# Patient Record
Sex: MALE | Race: WHITE | Employment: OTHER | URBAN - METROPOLITAN AREA
[De-identification: names, ages, dates, MRNs, and addresses within clinical notes are randomized per-mention and may not be internally consistent; named-entity substitution may affect disease eponyms.]

---

## 2020-12-09 ENCOUNTER — HOSPITAL ENCOUNTER (EMERGENCY)
Age: 80
Discharge: HOME | End: 2020-12-09
Payer: MEDICARE

## 2020-12-09 VITALS
HEART RATE: 70 BPM | DIASTOLIC BLOOD PRESSURE: 90 MMHG | OXYGEN SATURATION: 98 % | HEIGHT: 73 IN | WEIGHT: 230 LBS | BODY MASS INDEX: 30.48 KG/M2 | TEMPERATURE: 97.1 F | SYSTOLIC BLOOD PRESSURE: 150 MMHG | RESPIRATION RATE: 18 BRPM

## 2020-12-09 PROCEDURE — 99212 OFFICE O/P EST SF 10 MIN: CPT

## 2020-12-09 RX ORDER — LEVOTHYROXINE SODIUM 88 UG/1
88 TABLET ORAL DAILY
COMMUNITY

## 2020-12-09 RX ORDER — GLIPIZIDE 5 MG/1
5 TABLET ORAL
COMMUNITY

## 2020-12-09 SDOH — HEALTH STABILITY: MENTAL HEALTH: HOW OFTEN DO YOU HAVE A DRINK CONTAINING ALCOHOL?: NEVER

## 2020-12-09 ASSESSMENT — PAIN DESCRIPTION - ORIENTATION: ORIENTATION: RIGHT

## 2020-12-09 ASSESSMENT — PAIN - FUNCTIONAL ASSESSMENT: PAIN_FUNCTIONAL_ASSESSMENT: ACTIVITIES ARE NOT PREVENTED

## 2020-12-09 ASSESSMENT — PAIN DESCRIPTION - PROGRESSION: CLINICAL_PROGRESSION: NOT CHANGED

## 2020-12-09 ASSESSMENT — PAIN DESCRIPTION - ONSET: ONSET: ON-GOING

## 2020-12-09 ASSESSMENT — PAIN DESCRIPTION - DESCRIPTORS: DESCRIPTORS: OTHER (COMMENT)

## 2020-12-09 ASSESSMENT — PAIN SCALES - GENERAL: PAINLEVEL_OUTOF10: 2

## 2020-12-09 ASSESSMENT — PAIN DESCRIPTION - FREQUENCY: FREQUENCY: INTERMITTENT

## 2020-12-09 ASSESSMENT — PAIN DESCRIPTION - PAIN TYPE: TYPE: ACUTE PAIN

## 2020-12-09 ASSESSMENT — PAIN DESCRIPTION - LOCATION: LOCATION: CHEST

## 2020-12-09 NOTE — ED PROVIDER NOTES
Department of Emergency Medicine  [address] [place] [address]  Provider Note  Admit Date/Time: 2020  9:34 AM  Room:   NAME: Ana Mesa  : 1940  MRN: [id number]     Chief Complaint:  Chest Pain (Right Side/ Pt states \"It started Last night with a sense of Fullness & now this morning when I bend over I notice it so I don't know if it's COVID or My Heart\" )    History of Present Illness        Ana Mesa is a 78 y.o. male who has a past medical history of:   Past Medical History:   Diagnosis Date    Diabetes mellitus ([address]     Thyroid disease     presents to the urgent care center by private car for complaints of right upper chest pain only when he bends down to tie his shoes--- he said that it feels like  a sense of pressure. He said he thinks he could have Covid because of this or could be his heart. He denies any chest pain or shortness of breath now. He  said he does not have a fever or cold symptoms does not have a cough does not have loss of taste of smell, sore throat, fever, or body ache or any other complaints. Started last night. ROS    Pertinent positives and negatives are stated within HPI, all other systems reviewed and are negative. History reviewed. No pertinent surgical history. Social History:  reports that he quit smoking about 40 years ago. He has never used smokeless tobacco. He reports that he does not drink alcohol or use drugs. Family History: family history is not on file. Allergies: Patient has no known allergies. Physical Exam   Oxygen Saturation Interpretation: Normal.   ED Triage Vitals [20 0945]   BP Temp Temp Source Pulse Resp SpO2 Height Weight   (!) 150/90 97.1 °F (36.2 °C) Temporal 70 18 98 % 6' 1\" (1.854 m) 230 lb (104.3 kg)         Physical Exam  · Constitutional/General: Alert and oriented x3, well appearing, non toxic in NAD  · HEENT:  NC/NT.clear,  Airway patent.   · Neck: Supple, full ROM,   · Respiratory: Lungs clear to auscultation bilaterally, no wheezes, rales, or rhonchi. Not in respiratory distress  · CV:  Regular rate. Regular rhythm. · Chest: no chest wall tenderness  · Musculoskeletal: Moves all extremities x 4.   · Integument: skin warm and dry. No rashes. · Lymphatic: no lymphadenopathy noted  · Neurologic: GCS 15, no focal deficits,   · Psychiatric: Normal Affect    Lab / Imaging Results   (All laboratory and radiology results have been personally reviewed by myself)  Labs:  No results found for this visit on 12/09/20. Imaging: All Radiology results interpreted by Radiologist unless otherwise noted. No orders to display       ED Course / Medical Decision Making   Medications - No data to display         MDM:   Patient here he said last night and this morning when he went to tie his shoes he felt a sense of fullness in his right chest he said it was not really pain he said it is the only time he felt it. He does not have any pain now he does not have a fever does not have a cough does not have shortness of breath. Anthony Carroll he was worried it could be Covid because he read online that that could be a symptom. I did tell him  he has no symptoms of Covid  -- he denies cough ,shortness of breath, loss of taste or smell , or sore throat,  I told him that a  Sensation of pressure when you bend  down to tie your  Shoes is not a symptom of covid. .  I did tell him that I can do a chest x-ray to make sure that was okay-- but  he refused. I did tell him if he has any further chest pain he needs to go to the ED for a  cardiac work-up. Assessment      1.  Chest pain, unspecified type      Plan   Discharge to home and advised to contact Yonas Thomas, [address] [phone]    Schedule an appointment as soon as possible for a visit      Patient condition is good    New Medications     New Prescriptions    No medications on file     Electronically signed by NICK Sandy CNP

## 2022-07-10 ENCOUNTER — APPOINTMENT (OUTPATIENT)
Dept: ULTRASOUND IMAGING | Age: 82
End: 2022-07-10
Payer: MEDICARE

## 2022-07-10 ENCOUNTER — HOSPITAL ENCOUNTER (EMERGENCY)
Age: 82
Discharge: HOME | End: 2022-07-10
Attending: EMERGENCY MEDICINE
Payer: MEDICARE

## 2022-07-10 VITALS
TEMPERATURE: 97.4 F | RESPIRATION RATE: 16 BRPM | DIASTOLIC BLOOD PRESSURE: 75 MMHG | HEART RATE: 82 BPM | SYSTOLIC BLOOD PRESSURE: 179 MMHG | OXYGEN SATURATION: 98 %

## 2022-07-10 DIAGNOSIS — I80.01 THROMBOPHLEBITIS OF SUPERFICIAL VEINS OF RIGHT LOWER EXTREMITY: Primary | ICD-10-CM

## 2022-07-10 PROCEDURE — 6370000000 HC RX 637 (ALT 250 FOR IP): Performed by: PHYSICIAN ASSISTANT

## 2022-07-10 PROCEDURE — 99284 EMERGENCY DEPT VISIT MOD MDM: CPT

## 2022-07-10 PROCEDURE — 93971 EXTREMITY STUDY: CPT

## 2022-07-10 RX ORDER — ACETAMINOPHEN 500 MG
1000 TABLET ORAL ONCE
Status: COMPLETED | OUTPATIENT
Start: 2022-07-10 | End: 2022-07-10

## 2022-07-10 RX ADMIN — ACETAMINOPHEN 1000 MG: 500 TABLET ORAL at 11:33

## 2022-07-10 ASSESSMENT — PAIN DESCRIPTION - LOCATION: LOCATION: LEG

## 2022-07-10 ASSESSMENT — PAIN SCALES - GENERAL: PAINLEVEL_OUTOF10: 8

## 2022-07-10 NOTE — ED NOTES
Department of Emergency Medicine  FIRST PROVIDER TRIAGE NOTE             Independent MLP           7/10/22  10:33 AM EDT    Date of Encounter: 7/10/22   MRN: [id number]      HPI: Daria Holloway is a 78 y.o. male who presents to the ED for No chief complaint on file. Patient reports that for approximately 1 week he has noticed redness and swelling over the inside of his left lower extremity which has spread to his right inner thigh region due to no known injury. He states that he did see his primary care provider whom stated this was a \"vein\" but was not concerned for blood clot. He states he is presenting today as the pain is worsening. He denies additional symptoms. Patient denies taking blood thinner daily. Patient denies any history of DVTs. ROS: Negative for cp, sob, abd pain, back pain, fever, cough, vomiting, diarrhea, urinary complaints, headache or dizziness. PE: Gen Appearance/Constitutional: alert  CV: regular rate  Pulm: CTA bilat  GI: soft and NT     Initial Plan of Care: All treatment areas with department are currently occupied. Plan to order/Initiate the following while awaiting opening in ED: labs, ultrasound and analgesics.   Initiate Treatment-Testing, Proceed toTreatment Area When Bed Available for ED Attending/MLP to Continue Care    Electronically signed by JOSE LUIS Carrion   DD: 7/10/22         Myah Carrion  07/10/22 1036

## 2022-07-10 NOTE — ED PROVIDER NOTES
presenting with right leg pain, swelling. US consistent with superficial thrombophlebitis. Patient neurovascularly intact and instructed to use NSAIDs, heat, and elevation of right leg. He was given referral to vascular surgery at his request. He was discharged home in stable condition. ED Course as of 07/10/22 1251   Sun Jul 10, 2022   1236   ATTENDING PROVIDER ATTESTATION:     I have personally performed and/or participated in the history, exam, medical decision making, and procedures and agree with all pertinent clinical information unless otherwise noted. I have also reviewed and agree with the past medical, family and social history unless otherwise noted. I have discussed this patient in detail with the resident and provided the instruction and education regarding the evidence-based evaluation and treatment of right lower extremity pain. History: Patient has noticed that he has had pain in the right calf and thigh. No reported chest pain or shortness of breath. No recent travel or hospitalization. My findings: Bárbara Garvey is a 78 y.o. male whom is in no distress. Physical exam reveals reveals a mild area of erythema on the inner aspect of the right thigh and calf. There is ropiness underneath the area. Findings consistent with thrombophlebitis    My plan: Symptomatic and supportive care. Appropriate imaging    Electronically signed by Tatyana Rosales DO on 7/10/22 at 12:43 PM EDT       [MS]      ED Course User Index  [MS] Tatyana Rosales DO        ED Course as of 07/11/22 1013   Sun Jul 10, 2022   0239   ATTENDING PROVIDER ATTESTATION:     I have personally performed and/or participated in the history, exam, medical decision making, and procedures and agree with all pertinent clinical information unless otherwise noted. I have also reviewed and agree with the past medical, family and social history unless otherwise noted.     I have discussed this patient in detail with the resident and provided the instruction and education regarding the evidence-based evaluation and treatment of right lower extremity pain. History: Patient has noticed that he has had pain in the right calf and thigh. No reported chest pain or shortness of breath. No recent travel or hospitalization. My findings: Murtaza Ruano is a 78 y.o. male whom is in no distress. Physical exam reveals reveals a mild area of erythema on the inner aspect of the right thigh and calf. There is ropiness underneath the area. Findings consistent with thrombophlebitis    My plan: Symptomatic and supportive care. Appropriate imaging    Electronically signed by Susu Alexander DO on 7/10/22 at 12:43 PM EDT       [MS]      ED Course User Index  [MS] Susu Alexander DO       --------------------------------------------- PAST HISTORY ---------------------------------------------  Past Medical History:  has a past medical history of Diabetes mellitus ([facility] [address] and Thyroid disease. Past Surgical History:  has no past surgical history on file. Social History:  reports that he quit smoking about 42 years ago. He has never used smokeless tobacco. He reports that he does not drink alcohol and does not use drugs. Family History: family history is not on file. The patients home medications have been reviewed. Allergies: Patient has no known allergies. -------------------------------------------------- RESULTS -------------------------------------------------  Labs:  No results found for this visit on 07/10/22. Radiology:  US DUP LOWER EXTREMITY RIGHT [place]   Final Result   1. Upper right calf superficial thrombophlebitis. 2.  No DVT, right lower extremity.             ------------------------- NURSING NOTES AND VITALS REVIEWED ---------------------------  Date / Time Roomed:  7/10/2022 11:18 AM  ED Bed Assignment:  20/20    The nursing notes within the ED encounter and vital signs as below have been reviewed.    BP (!) 179/75 Pulse 82   Temp 97.4 °F (36.3 °C) (Oral)   Resp 16   SpO2 98%   Oxygen Saturation Interpretation: Normal      ------------------------------------------ PROGRESS NOTES ------------------------------------------    I have spoken with the patient and discussed todays results, in addition to providing specific details for the plan of care and counseling regarding the diagnosis and prognosis. Their questions are answered at this time and they are agreeable with the plan. I discussed at length with them reasons for immediate return here for re evaluation. They will followup with vascular surgery by calling their office tomorrow and on Monday.      --------------------------------- ADDITIONAL PROVIDER NOTES ---------------------------------  At this time the patient is without objective evidence of an acute process requiring hospitalization or inpatient management. They have remained hemodynamically stable throughout their entire ED visit and are stable for discharge with outpatient follow-up. The plan has been discussed in detail and they are aware of the specific conditions for emergent return, as well as the importance of follow-up. Discharge Medication List as of 7/10/2022 12:42 PM          Diagnosis:  1. Thrombophlebitis of superficial veins of right lower extremity        Disposition:  Patient's disposition: Discharge to home  Patient's condition is stable.           Lori Holbrook MD  Resident  07/11/22 5497

## 2022-07-11 ASSESSMENT — ENCOUNTER SYMPTOMS
COUGH: 0
SHORTNESS OF BREATH: 0

## 2022-07-17 ENCOUNTER — APPOINTMENT (OUTPATIENT)
Dept: ULTRASOUND IMAGING | Age: 82
End: 2022-07-17
Payer: MEDICARE

## 2022-07-17 ENCOUNTER — HOSPITAL ENCOUNTER (EMERGENCY)
Age: 82
Discharge: HOME | End: 2022-07-17
Attending: EMERGENCY MEDICINE
Payer: MEDICARE

## 2022-07-17 VITALS
WEIGHT: 220 LBS | OXYGEN SATURATION: 97 % | BODY MASS INDEX: 29.16 KG/M2 | SYSTOLIC BLOOD PRESSURE: 146 MMHG | HEIGHT: 73 IN | DIASTOLIC BLOOD PRESSURE: 67 MMHG | HEART RATE: 74 BPM | TEMPERATURE: 98.2 F | RESPIRATION RATE: 16 BRPM

## 2022-07-17 DIAGNOSIS — M79.605 BILATERAL LEG PAIN: Primary | ICD-10-CM

## 2022-07-17 DIAGNOSIS — M79.604 BILATERAL LEG PAIN: Primary | ICD-10-CM

## 2022-07-17 PROCEDURE — 93970 EXTREMITY STUDY: CPT

## 2022-07-17 PROCEDURE — 99284 EMERGENCY DEPT VISIT MOD MDM: CPT

## 2022-07-17 ASSESSMENT — ENCOUNTER SYMPTOMS
SHORTNESS OF BREATH: 0
COUGH: 0
NAUSEA: 0
ABDOMINAL PAIN: 0
BACK PAIN: 0

## 2022-07-17 NOTE — ED PROVIDER NOTES
41-year-old male diagnosed with superficial thrombophlebitis 1 week ago presenting for leg pain. Symptoms are present for 1 week and are present in both legs. Patient states that when he lays down at night and puts his legs up, he develops burning in both legs. He states he is not any pain with walking or any other time. He has been taking Advil and using heat compresses. He called his PCP and dermatologist this week who told him to come to the ED, but patient states that he has a date to come. Vascular surgery follow-up this Wednesday. Patient denies any fevers, chest pain, shortness of breath, numbness, or tingling. Review of Systems   Constitutional:  Negative for chills and fever. Respiratory:  Negative for cough and shortness of breath. Cardiovascular:  Negative for chest pain. Gastrointestinal:  Negative for abdominal pain and nausea. Musculoskeletal:  Negative for back pain. Bilateral leg pain   Skin:  Negative for wound. Neurological:  Negative for dizziness, weakness, light-headedness and numbness. All other systems reviewed and are negative. Physical Exam  Constitutional:       General: He is not in acute distress. Appearance: He is not ill-appearing. HENT:      Head: Normocephalic and atraumatic. Right Ear: External ear normal.      Left Ear: External ear normal.      Nose: Nose normal.   Eyes:      Conjunctiva/sclera: Conjunctivae normal.   Cardiovascular:      Rate and Rhythm: Normal rate and regular rhythm. Comments: Distal pulses intact  Pulmonary:      Effort: Pulmonary effort is normal.      Breath sounds: Normal breath sounds. Abdominal:      General: There is no distension. Musculoskeletal:      Comments: Palpable cord right medial leg, tender to palpation with very mild overlying erythema; palpable cord left medial leg, tender to palpation with very minimal erythema   Skin:     General: Skin is warm and dry.    Neurological:      General: No focal deficit present. Mental Status: He is alert. Sensory: No sensory deficit. Motor: No weakness. Psychiatric:         Mood and Affect: Mood normal.         Behavior: Behavior normal.        Procedures     MDM  Number of Diagnoses or Management Options  Bilateral leg pain  Diagnosis management comments: 78-year-old male with recently diagnosed, phlebitis presenting for continued leg pain. Bilateral lower extremity ultrasound was obtained, was unremarkable. Patient has follow-up appointment with vascular surgeon on Wednesday. This time, he is stable for discharge to outpatient follow-up. He was discharged home in stable condition with instructions to follow-up with his vascular surgeon on Wednesday. ED Course as of 07/18/22 0029   [place] Jul 17, 2022   4190   ATTENDING PROVIDER ATTESTATION:     I have personally performed and/or participated in the history, exam, medical decision making, and procedures and agree with all pertinent clinical information unless otherwise noted. I have also reviewed and agree with the past medical, family and social history unless otherwise noted. I have discussed this patient in detail with the resident and provided the instruction and education regarding the evidence-based evaluation and treatment of thrombuphlebitis. History: Patient had been seen here recently and was diagnosed with thrombophlebitis. Was having persistent symptoms and called his PCP who advised him to come here to have another ultrasound done. He has an appoint with a vascular surgeon on Wednesday. He denies any chest pain or shortness of breath. My findings: Gillian Barcenas is a 78 y.o. male whom is in no distress. Physical exam reveals patient to be lying in bed comfortably. He does have extensive thrombophlebitis of the right lower extremity. Areas are tender and ropey. No edema of the lower extremities. My plan: Symptomatic and supportive care.   Appropriate imaging    Electronically signed by Marylou Lane DO on 7/17/22 at 5:15 PM EDT       [MS]      ED Course User Index  [MS] Marylou Lane DO        ED Course as of 07/18/22 0029   Sun Jul 17, 2022   1715   ATTENDING PROVIDER ATTESTATION:     I have personally performed and/or participated in the history, exam, medical decision making, and procedures and agree with all pertinent clinical information unless otherwise noted. I have also reviewed and agree with the past medical, family and social history unless otherwise noted. I have discussed this patient in detail with the resident and provided the instruction and education regarding the evidence-based evaluation and treatment of thrombuphlebitis. History: Patient had been seen here recently and was diagnosed with thrombophlebitis. Was having persistent symptoms and called his PCP who advised him to come here to have another ultrasound done. He has an appoint with a vascular surgeon on Wednesday. He denies any chest pain or shortness of breath. My findings: Fay Pulido is a 78 y.o. male whom is in no distress. Physical exam reveals patient to be lying in bed comfortably. He does have extensive thrombophlebitis of the right lower extremity. Areas are tender and ropey. No edema of the lower extremities. My plan: Symptomatic and supportive care. Appropriate imaging    Electronically signed by Marylou Lane DO on 7/17/22 at 5:15 PM EDT       [MS]      ED Course User Index  [MS] Marylou Lane DO       --------------------------------------------- PAST HISTORY ---------------------------------------------  Past Medical History:  has a past medical history of Diabetes mellitus ([facility] [address] and Thyroid disease. Past Surgical History:  has no past surgical history on file. Social History:  reports that he quit smoking about 42 years ago.  He has never used smokeless tobacco. He reports that he does not drink alcohol and does not use drugs. Family History: family history is not on file. The patients home medications have been reviewed. Allergies: Patient has no known allergies. -------------------------------------------------- RESULTS -------------------------------------------------  Labs:  No results found for this visit on 07/17/22. Radiology:  US DUP LOWER EXTREMITIES BILATERAL VENOUS   Final Result   No evidence of DVT in either lower extremity.             ------------------------- NURSING NOTES AND VITALS REVIEWED ---------------------------  Date / Time Roomed:  7/17/2022  3:18 PM  ED Bed Assignment:  13/13    The nursing notes within the ED encounter and vital signs as below have been reviewed. BP (!) 146/67   Pulse 74   Temp 98.2 °F (36.8 °C) (Infrared)   Resp 16   Ht 6' 1\" (1.854 m)   Wt 220 lb (99.8 kg)   SpO2 97%   BMI 29.03 kg/m²   Oxygen Saturation Interpretation: Normal      ------------------------------------------ PROGRESS NOTES ------------------------------------------    I have spoken with the patient and discussed todays results, in addition to providing specific details for the plan of care and counseling regarding the diagnosis and prognosis. Their questions are answered at this time and they are agreeable with the plan. I discussed at length with them reasons for immediate return here for re evaluation. They will followup with their  vascular surgeon  by going to their office on Wednesday.      --------------------------------- ADDITIONAL PROVIDER NOTES ---------------------------------  At this time the patient is without objective evidence of an acute process requiring hospitalization or inpatient management. They have remained hemodynamically stable throughout their entire ED visit and are stable for discharge with outpatient follow-up. The plan has been discussed in detail and they are aware of the specific conditions for emergent return, as well as the importance of follow-up. Discharge Medication List as of 7/17/2022  5:27 PM          Diagnosis:  1. Bilateral leg pain        Disposition:  Patient's disposition: Discharge to home  Patient's condition is stable.        Jenifer Angela MD  Resident  07/18/22 2005

## 2025-04-01 PROBLEM — E78.2 MIXED HYPERLIPIDEMIA: Status: ACTIVE | Noted: 2025-04-01

## 2025-04-01 PROBLEM — C61 PROSTATE CANCER (MULTI): Status: ACTIVE | Noted: 2025-04-01

## 2025-04-01 PROBLEM — I10 BENIGN ESSENTIAL HYPERTENSION: Status: ACTIVE | Noted: 2025-04-01

## 2025-04-01 PROBLEM — N52.9 ED (ERECTILE DYSFUNCTION): Status: ACTIVE | Noted: 2025-04-01

## 2025-04-01 PROBLEM — R07.89 MID STERNAL CHEST PAIN: Status: ACTIVE | Noted: 2025-04-01

## 2025-04-01 PROBLEM — I25.10 CORONARY ARTERY DISEASE, NON-OCCLUSIVE: Status: ACTIVE | Noted: 2025-04-01

## 2025-04-01 PROBLEM — R42 LIGHTHEADEDNESS: Status: ACTIVE | Noted: 2025-04-01

## 2025-04-01 PROBLEM — T66.XXXS LATE EFFECT OF RADIATION: Status: ACTIVE | Noted: 2025-04-01

## 2025-04-01 PROBLEM — E11.9 DIABETES MELLITUS (MULTI): Status: ACTIVE | Noted: 2025-04-01

## 2025-04-01 PROBLEM — R35.0 URINARY FREQUENCY: Status: ACTIVE | Noted: 2025-04-01

## 2025-04-03 ENCOUNTER — OFFICE VISIT (OUTPATIENT)
Dept: CARDIOLOGY | Facility: CLINIC | Age: 85
End: 2025-04-03
Payer: MEDICARE

## 2025-04-03 VITALS
DIASTOLIC BLOOD PRESSURE: 83 MMHG | WEIGHT: 202.56 LBS | HEART RATE: 93 BPM | OXYGEN SATURATION: 96 % | BODY MASS INDEX: 26.85 KG/M2 | SYSTOLIC BLOOD PRESSURE: 129 MMHG | HEIGHT: 73 IN

## 2025-04-03 DIAGNOSIS — I50.32 CHRONIC DIASTOLIC HEART FAILURE: Primary | ICD-10-CM

## 2025-04-03 DIAGNOSIS — I44.7 LBBB (LEFT BUNDLE BRANCH BLOCK): ICD-10-CM

## 2025-04-03 DIAGNOSIS — I25.10 CORONARY ARTERY DISEASE, NON-OCCLUSIVE: ICD-10-CM

## 2025-04-03 DIAGNOSIS — E11.9 DIABETES MELLITUS TYPE II, NON INSULIN DEPENDENT (MULTI): ICD-10-CM

## 2025-04-03 PROCEDURE — 1036F TOBACCO NON-USER: CPT | Performed by: INTERNAL MEDICINE

## 2025-04-03 PROCEDURE — 99204 OFFICE O/P NEW MOD 45 MIN: CPT | Performed by: INTERNAL MEDICINE

## 2025-04-03 PROCEDURE — 3078F DIAST BP <80 MM HG: CPT | Performed by: INTERNAL MEDICINE

## 2025-04-03 PROCEDURE — 3074F SYST BP LT 130 MM HG: CPT | Performed by: INTERNAL MEDICINE

## 2025-04-03 PROCEDURE — 1160F RVW MEDS BY RX/DR IN RCRD: CPT | Performed by: INTERNAL MEDICINE

## 2025-04-03 PROCEDURE — 93010 ELECTROCARDIOGRAM REPORT: CPT | Performed by: INTERNAL MEDICINE

## 2025-04-03 PROCEDURE — 93005 ELECTROCARDIOGRAM TRACING: CPT | Performed by: INTERNAL MEDICINE

## 2025-04-03 PROCEDURE — 99214 OFFICE O/P EST MOD 30 MIN: CPT | Mod: 25 | Performed by: INTERNAL MEDICINE

## 2025-04-03 PROCEDURE — 1159F MED LIST DOCD IN RCRD: CPT | Performed by: INTERNAL MEDICINE

## 2025-04-03 PROCEDURE — 1126F AMNT PAIN NOTED NONE PRSNT: CPT | Performed by: INTERNAL MEDICINE

## 2025-04-03 RX ORDER — FLASH GLUCOSE SENSOR
KIT MISCELLANEOUS
COMMUNITY
Start: 2025-01-08

## 2025-04-03 RX ORDER — LEVOTHYROXINE SODIUM 88 UG/1
1 TABLET ORAL
COMMUNITY
Start: 2025-03-14

## 2025-04-03 RX ORDER — GLIPIZIDE 2.5 MG/1
TABLET ORAL
COMMUNITY
Start: 2024-12-06 | End: 2025-04-03 | Stop reason: ALTCHOICE

## 2025-04-03 RX ORDER — EMPAGLIFLOZIN 10 MG/1
1 TABLET, FILM COATED ORAL
COMMUNITY
Start: 2025-03-26

## 2025-04-03 RX ORDER — METFORMIN HYDROCHLORIDE 500 MG/1
1 TABLET ORAL
COMMUNITY
Start: 2024-07-17 | End: 2025-04-03 | Stop reason: ALTCHOICE

## 2025-04-03 RX ORDER — LATANOPROST 50 UG/ML
1 SOLUTION/ DROPS OPHTHALMIC
COMMUNITY
Start: 2024-05-11

## 2025-04-03 RX ORDER — BIMATOPROST 0.1 MG/ML
1 SOLUTION/ DROPS OPHTHALMIC
COMMUNITY
Start: 2025-02-04

## 2025-04-03 RX ORDER — EMPAGLIFLOZIN 25 MG/1
1 TABLET, FILM COATED ORAL
COMMUNITY
Start: 2025-03-07 | End: 2025-04-03 | Stop reason: DRUGHIGH

## 2025-04-03 RX ORDER — ATORVASTATIN CALCIUM 10 MG/1
TABLET, FILM COATED ORAL
COMMUNITY
Start: 2024-07-30 | End: 2025-04-03 | Stop reason: ALTCHOICE

## 2025-04-03 RX ORDER — NAPROXEN SODIUM 220 MG/1
1 TABLET, FILM COATED ORAL DAILY
COMMUNITY

## 2025-04-03 RX ORDER — SILDENAFIL CITRATE 20 MG/1
TABLET ORAL
COMMUNITY
Start: 2016-07-29

## 2025-04-03 RX ORDER — ROSUVASTATIN CALCIUM 5 MG/1
1 TABLET, COATED ORAL
COMMUNITY
Start: 2024-08-27 | End: 2025-04-03 | Stop reason: ALTCHOICE

## 2025-04-03 RX ORDER — NIACIN (INOSITOL NIACINATE) 400(500MG)
1 CAPSULE ORAL DAILY
COMMUNITY
Start: 2013-10-23

## 2025-04-03 RX ORDER — GLIMEPIRIDE 2 MG/1
1 TABLET ORAL DAILY
COMMUNITY
Start: 2019-01-07 | End: 2025-04-03 | Stop reason: ALTCHOICE

## 2025-04-03 ASSESSMENT — ENCOUNTER SYMPTOMS
NEAR-SYNCOPE: 0
OCCASIONAL FEELINGS OF UNSTEADINESS: 1
DYSPNEA ON EXERTION: 0
DEPRESSION: 0
PND: 0
CLAUDICATION: 0
SYNCOPE: 0
LOSS OF SENSATION IN FEET: 0
ORTHOPNEA: 0
IRREGULAR HEARTBEAT: 0
PALPITATIONS: 0

## 2025-04-03 ASSESSMENT — PATIENT HEALTH QUESTIONNAIRE - PHQ9
SUM OF ALL RESPONSES TO PHQ9 QUESTIONS 1 AND 2: 0
1. LITTLE INTEREST OR PLEASURE IN DOING THINGS: NOT AT ALL
2. FEELING DOWN, DEPRESSED OR HOPELESS: NOT AT ALL

## 2025-04-03 ASSESSMENT — COLUMBIA-SUICIDE SEVERITY RATING SCALE - C-SSRS
2. HAVE YOU ACTUALLY HAD ANY THOUGHTS OF KILLING YOURSELF?: NO
1. IN THE PAST MONTH, HAVE YOU WISHED YOU WERE DEAD OR WISHED YOU COULD GO TO SLEEP AND NOT WAKE UP?: NO
6. HAVE YOU EVER DONE ANYTHING, STARTED TO DO ANYTHING, OR PREPARED TO DO ANYTHING TO END YOUR LIFE?: NO

## 2025-04-03 ASSESSMENT — PAIN SCALES - GENERAL: PAINLEVEL_OUTOF10: 0-NO PAIN

## 2025-04-03 NOTE — PROGRESS NOTES
Primary Care Physician: Alex Borges MD  Date of Visit: 04/03/2025  8:20 AM EDT  [place] [facility] [place] [address] [place]   Last office visit: Visit date not found    Chief Complaint:     New Patient Visit, Hypertension, Chest Pain, and Coronary Artery Disease     HPI/Summary  Jr Grant is a 84 y.o. male who presents for cardiology evaluation.     The patient is a former patient, last seen in the office in January, 2019.  At that time, we recognized hypertension, hyperlipidemia and atrial fibrillation status post catheter ablation in 2009.  He had presented in 2018 with exertional chest discomfort that first developed on the treadmill.  We obtained cardiac catheterization at [facility] on December 21, 2018.  There was no aortic stenosis.  The LVEDP was mildly elevated, there was a 10 to 30% proximal LAD stenosis.    Generally, doing well.  Still independent with self-care.  No angina.  Some lower extremity edema.  Several medications have been withdrawn, note he is no longer on statin therapy.  He had been on both rosuvastatin and atorvastatin in the past according to medical records.  Some lightheadedness with rapid positional change, but no recent falls.  Diabetes was recognized a few years ago, he has been intolerant of metformin, now on monotherapy with Jardiance.  He apparently is alternating the 10 and 25 mg dosages.    Past medical history: Hypertension, atrial fibrillation status post catheter ablation in 2009, mixed hyperlipidemia.  Diabetes diagnosed several years ago.    Past surgical history: None, except catheter ablation.    Social history: , lives in [facility].  4 children, 4 grandchildren.  Retired from [employer].  Patient exercises 3 times weekly, treadmill and free weights.    Current cardiac medications: Aspirin 81 mg daily, Jardiance 10 mg daily, levothyroxine 88 mcg daily.    Specialty Problems          Cardiology Problems    Benign essential hypertension     "Coronary artery disease, non-occlusive    Mid sternal chest pain    Mixed hyperlipidemia    Chronic diastolic heart failure    LBBB (left bundle branch block)      Social History     Tobacco Use    Smoking status: Former     Types: Cigarettes    Smokeless tobacco: Never      Allergies   Allergen Reactions    Iodinated Contrast Media Other     Current Outpatient Medications   Medication Instructions    aspirin 81 mg chewable tablet 1 tablet, oral, Daily    coenzyme Q10-vitamin E 100-5 mg-unit capsule 1 capsule, Daily    FreeStyle Aristeo 14 Day Sensor kit CHANGE EVERY 14 DAYS    Jardiance 10 mg tablet 1 tablet, Daily (0630)    latanoprost (Xalatan) 0.005 % ophthalmic solution 1 drop    levothyroxine (Synthroid, Levoxyl) 88 mcg tablet 1 tablet, Daily (0630)    Lumigan 0.01 % ophthalmic solution 1 drop    sildenafil (Revatio) 20 mg tablet Take by mouth.       Review of Systems   Cardiovascular:  Negative for chest pain, claudication, dyspnea on exertion, irregular heartbeat, leg swelling, near-syncope, orthopnea, palpitations, paroxysmal nocturnal dyspnea and syncope.       Vital Signs:  Vitals:    04/03/25 0826 04/03/25 0830   BP: 137/75 129/83   BP Location: Left arm Right arm   Patient Position: Sitting Sitting   BP Cuff Size: Large adult Large adult   Pulse: 92 93   SpO2: 96%    Weight: 91.9 kg (202 lb 9 oz)    Height: 1.854 m (6' 1\")      Wt Readings from Last 2 Encounters:   04/03/25 91.9 kg (202 lb 9 oz)     Body mass index is 26.72 kg/m².     Physical Exam:    Pleasant elderly man, not in any acute distress.  No carotid bruits detected.  Lungs clear.  Heart sounds regular, widely split S2.  No murmurs appreciated.  No S3.  Abdomen nontender.  1+ pretibial edema noted.  Otherwise, examination unremarkable.     Lab Review:  CBC:  Lab Results   Component Value Date    WBC 11.0 12/21/2018    HGB 15.3 12/21/2018    HCT 45.1 12/21/2018    MCV 94 12/21/2018     12/21/2018       CMP:  Recent Labs     " "12/21/18  0846   GLUCOSE 247*      K 3.9   CL 98   CO2 25   ANIONGAP 18   BUN 16   CREATININE 0.85         LIPID PANEL:  No results found for: \"CHOL\", \"HDL\", \"CHHDL\", \"LDL\", \"VLDL\", \"TRIG\", \"NHDL\"    HEME/ENDO:  No results found for: \"HGBA1C\", \"TSH\"      No results for input(s): \"BNP\" in the last 73343 hours.  Recent Cardiology Tests:    ECG:    Normal sinus rhythm, left axis deviation, left bundle branch block.    No results found for this or any previous visit.       Echo:  Echo Results:  No results found for this or any previous visit from the past 3650 days.       Cath:      Stress Test:  Stress Results:  No results found for this or any previous visit from the past 365 days.         Cardiac Imaging:        Assessment/Plan   We reviewed the patient's medical history.  Today, he was not orthostatic but he does have some positional lightheadedness.  This may reflect some diabetic autonomic dysfunction.  He does have mild lower extremity edema, and I think that it would be useful to at least assess LV function with an echocardiogram.  He has no overt findings of heart failure at this time, however.  We will check some routine laboratory tests, likely placed the patient back on statin therapy, and we would plan on getting giving him a call after we review the testing.    Orders:  Orders Placed This Encounter   Procedures    Comprehensive Metabolic Panel    B-Type Natriuretic Peptide    Albumin-Creatinine Ratio, Urine Random    Hemoglobin A1C    Cholesterol, LDL Direct    Cholesterol    CBC    TSH with reflex to Free T4 if abnormal    ECG 12 lead (Clinic Performed)    Transthoracic Echo (TTE) Complete      Followup Appts:  No future appointments.        ____________________________________________________________  Grover Denis MD    Senior Attending Physician  [facility] [place]  [facility]    [facility]ovascular Excellence  Case " [facility]

## 2025-04-03 NOTE — PATIENT INSTRUCTIONS
1.  Schedule echocardiogram.    2.  Laboratory testing today, including a urine specimen.    3.  We will schedule a follow-up visit after we call you to review the test results.

## 2025-04-04 LAB
ALBUMIN SERPL-MCNC: 4.2 G/DL (ref 3.6–5.1)
ALBUMIN/CREAT UR: 27 MG/G CREAT
ALP SERPL-CCNC: 93 U/L (ref 35–144)
ALT SERPL-CCNC: 17 U/L (ref 9–46)
ANION GAP SERPL CALCULATED.4IONS-SCNC: 11 MMOL/L (CALC) (ref 7–17)
AST SERPL-CCNC: 17 U/L (ref 10–35)
ATRIAL RATE: 86 BPM
BILIRUB SERPL-MCNC: 0.6 MG/DL (ref 0.2–1.2)
BNP SERPL-MCNC: 186 PG/ML
BUN SERPL-MCNC: 20 MG/DL (ref 7–25)
CALCIUM SERPL-MCNC: 9.2 MG/DL (ref 8.6–10.3)
CHLORIDE SERPL-SCNC: 100 MMOL/L (ref 98–110)
CHOLEST SERPL-MCNC: 228 MG/DL
CO2 SERPL-SCNC: 26 MMOL/L (ref 20–32)
CREAT SERPL-MCNC: 0.71 MG/DL (ref 0.7–1.22)
CREAT UR-MCNC: 26 MG/DL (ref 20–320)
EGFRCR SERPLBLD CKD-EPI 2021: 89 ML/MIN/1.73M2
ERYTHROCYTE [DISTWIDTH] IN BLOOD BY AUTOMATED COUNT: 13.5 % (ref 11–15)
EST. AVERAGE GLUCOSE BLD GHB EST-MCNC: 266 MG/DL
EST. AVERAGE GLUCOSE BLD GHB EST-SCNC: 14.7 MMOL/L
GLUCOSE SERPL-MCNC: 340 MG/DL (ref 65–99)
HBA1C MFR BLD: 10.9 % OF TOTAL HGB
HCT VFR BLD AUTO: 45.7 % (ref 38.5–50)
HGB BLD-MCNC: 15.4 G/DL (ref 13.2–17.1)
LDLC SERPL DIRECT ASSAY-MCNC: 130 MG/DL
MCH RBC QN AUTO: 31.8 PG (ref 27–33)
MCHC RBC AUTO-ENTMCNC: 33.7 G/DL (ref 32–36)
MCV RBC AUTO: 94.4 FL (ref 80–100)
MICROALBUMIN UR-MCNC: 0.7 MG/DL
P AXIS: 59 DEGREES
P OFFSET: 141 MS
P ONSET: 97 MS
PLATELET # BLD AUTO: 180 THOUSAND/UL (ref 140–400)
PMV BLD REES-ECKER: 10.8 FL (ref 7.5–12.5)
POTASSIUM SERPL-SCNC: 3.9 MMOL/L (ref 3.5–5.3)
PR INTERVAL: 206 MS
PROT SERPL-MCNC: 7.1 G/DL (ref 6.1–8.1)
Q ONSET: 200 MS
QRS COUNT: 14 BEATS
QRS DURATION: 174 MS
QT INTERVAL: 426 MS
QTC CALCULATION(BAZETT): 509 MS
QTC FREDERICIA: 480 MS
R AXIS: -37 DEGREES
RBC # BLD AUTO: 4.84 MILLION/UL (ref 4.2–5.8)
SODIUM SERPL-SCNC: 137 MMOL/L (ref 135–146)
T AXIS: 123 DEGREES
T OFFSET: 413 MS
TSH SERPL-ACNC: 1.34 MIU/L (ref 0.4–4.5)
VENTRICULAR RATE: 86 BPM
WBC # BLD AUTO: 8 THOUSAND/UL (ref 3.8–10.8)

## 2025-04-05 NOTE — RESULT ENCOUNTER NOTE
Spoke with patient, reviewed testing.  BNP modestly elevated, hemoglobin A1c suggests poor diabetic control.  He has been intolerant of metformin.  Already on Jardiance.  Recommend that he stay on the 25 mg dose, and we will see him back in the office, likely begin Ozempic or Mounjaro.  Also, we will likely add high-dose statin therapy and spironolactone.  The BNP is modestly elevated consistent with heart failure.  Plan office follow-up after echocardiogram, to review testing and reassess medical regimen.

## 2025-04-22 ENCOUNTER — HOSPITAL ENCOUNTER (OUTPATIENT)
Dept: CARDIOLOGY | Facility: CLINIC | Age: 85
Discharge: HOME | End: 2025-04-22
Payer: MEDICARE

## 2025-04-22 DIAGNOSIS — I50.32 CHRONIC DIASTOLIC HEART FAILURE: ICD-10-CM

## 2025-04-22 DIAGNOSIS — I25.10 CORONARY ARTERY DISEASE, NON-OCCLUSIVE: ICD-10-CM

## 2025-04-22 DIAGNOSIS — E11.9 DIABETES MELLITUS TYPE II, NON INSULIN DEPENDENT (MULTI): ICD-10-CM

## 2025-04-22 PROCEDURE — C8929 TTE W OR WO FOL WCON,DOPPLER: HCPCS

## 2025-04-22 PROCEDURE — 93306 TTE W/DOPPLER COMPLETE: CPT | Performed by: INTERNAL MEDICINE

## 2025-04-22 PROCEDURE — 2500000004 HC RX 250 GENERAL PHARMACY W/ HCPCS (ALT 636 FOR OP/ED): Mod: JZ | Performed by: INTERNAL MEDICINE

## 2025-04-22 RX ADMIN — PERFLUTREN 2.5 ML OF DILUTION: 6.52 INJECTION, SUSPENSION INTRAVENOUS at 14:37

## 2025-04-23 LAB
AORTIC VALVE MEAN GRADIENT: 2 MMHG
AORTIC VALVE PEAK VELOCITY: 1.32 M/S
AV PEAK GRADIENT: 7 MMHG
AVA (PEAK VEL): 2.42 CM2
AVA (VTI): 3.82 CM2
EJECTION FRACTION APICAL 4 CHAMBER: 32.4
EJECTION FRACTION: 30 %
LEFT ATRIUM VOLUME AREA LENGTH INDEX BSA: 22.6 ML/M2
LEFT VENTRICLE INTERNAL DIMENSION DIASTOLE: 5.73 CM (ref 3.5–6)
LEFT VENTRICULAR OUTFLOW TRACT DIAMETER: 2.25 CM
MITRAL VALVE E/A RATIO: 0.72
RIGHT VENTRICLE FREE WALL PEAK S': 10.48 CM/S
TRICUSPID ANNULAR PLANE SYSTOLIC EXCURSION: 2.2 CM

## 2025-04-23 NOTE — RESULT ENCOUNTER NOTE
Left voicemail.  Echocardiogram shows severe LV dysfunction, ejection fraction 30%.  Dilated LV, global hypokinesis, mild to moderate MR.  Patient has an appointment in 1 week to discuss results, initiate therapy.  New severe LV systolic dysfunction.  Uncontrolled diabetes.  Will need to add carvedilol, Entresto, spironolactone, uptitrate Jardiance, consider a GLP-1.  After optimization, discuss cardiac catheterization or stress MRI.

## 2025-04-29 PROBLEM — H52.4 PRESBYOPIA: Status: ACTIVE | Noted: 2025-04-29

## 2025-04-29 PROBLEM — H26.9 NONSENILE CATARACT: Status: ACTIVE | Noted: 2025-04-29

## 2025-04-29 PROBLEM — H40.059 OCULAR HYPERTENSION: Status: ACTIVE | Noted: 2025-04-29

## 2025-04-29 PROBLEM — L98.9 DISORDER OF SKIN: Status: ACTIVE | Noted: 2025-04-29

## 2025-04-29 PROBLEM — H01.009 BLEPHARITIS: Status: ACTIVE | Noted: 2025-04-29

## 2025-04-29 RX ORDER — KETOCONAZOLE 20 MG/ML
1 SHAMPOO, SUSPENSION TOPICAL WEEKLY
COMMUNITY
End: 2025-04-30 | Stop reason: ALTCHOICE

## 2025-04-29 RX ORDER — OXYBUTYNIN CHLORIDE 10 MG/1
10 TABLET, EXTENDED RELEASE ORAL DAILY
COMMUNITY
End: 2025-04-30 | Stop reason: ALTCHOICE

## 2025-04-29 RX ORDER — KETOCONAZOLE 20 MG/G
1 CREAM TOPICAL DAILY
COMMUNITY
End: 2025-04-30 | Stop reason: ALTCHOICE

## 2025-04-29 RX ORDER — FLUOROURACIL 50 MG/G
CREAM TOPICAL
COMMUNITY
End: 2025-04-30 | Stop reason: ALTCHOICE

## 2025-04-29 RX ORDER — BLOOD SUGAR DIAGNOSTIC
STRIP MISCELLANEOUS
COMMUNITY
Start: 2017-04-20

## 2025-04-29 RX ORDER — GLIMEPIRIDE 2 MG/1
1 TABLET ORAL DAILY
COMMUNITY
End: 2025-04-30 | Stop reason: ALTCHOICE

## 2025-04-29 RX ORDER — DIGOXIN 125 MCG
TABLET ORAL
COMMUNITY
End: 2025-04-30 | Stop reason: ALTCHOICE

## 2025-04-29 RX ORDER — METFORMIN HYDROCHLORIDE 500 MG/1
3 TABLET ORAL
COMMUNITY
Start: 2015-05-15 | End: 2025-04-30 | Stop reason: ALTCHOICE

## 2025-04-29 RX ORDER — NITROGLYCERIN 0.4 MG/1
0.4 TABLET SUBLINGUAL EVERY 5 MIN PRN
COMMUNITY
Start: 2018-12-05

## 2025-04-29 RX ORDER — LISINOPRIL 5 MG/1
5 TABLET ORAL DAILY
COMMUNITY
End: 2025-04-30 | Stop reason: ALTCHOICE

## 2025-04-29 RX ORDER — ACETAMINOPHEN 325 MG/1
2 TABLET ORAL 4 TIMES DAILY PRN
COMMUNITY

## 2025-04-29 RX ORDER — LORAZEPAM 0.5 MG/1
1 TABLET ORAL DAILY PRN
COMMUNITY
End: 2025-04-30 | Stop reason: ALTCHOICE

## 2025-04-29 RX ORDER — DORZOLAMIDE HYDROCHLORIDE AND TIMOLOL MALEATE 20; 5 MG/ML; MG/ML
1 SOLUTION/ DROPS OPHTHALMIC 2 TIMES DAILY
COMMUNITY

## 2025-04-29 RX ORDER — TRIAMCINOLONE ACETONIDE 1 MG/G
CREAM TOPICAL
COMMUNITY

## 2025-04-29 RX ORDER — GABAPENTIN 100 MG/1
1 CAPSULE ORAL 3 TIMES DAILY PRN
COMMUNITY
End: 2025-04-30 | Stop reason: ALTCHOICE

## 2025-04-29 NOTE — PROGRESS NOTES
Primary Care Physician: Alex Borges MD  Date of Visit: 04/30/2025  2:20 PM EDT  Location of visit: [facility] [place] [address]   Last office visit: 4/3/2025    Chief Complaint:     Follow-up 4-week    HPI/Summary  Jr Grant is a 84 y.o. male who presents for followup cardiology evaluation.     Patient had not been seen in 6 years.  He is status post catheter ablation of atrial fibrillation in 2009, and we note that cardiac catheterization on December 21, 2018 showed no aortic stenosis, a mildly elevated LVEDP, and a 10 to 30% proximal LAD stenosis.  He returned to the office for reevaluation last month, we identified that he has been recognized with diabetes, on monotherapy with Jardiance.  He noted lower extremity edema.    An echocardiogram on April 22, 2025 showed severe LV dysfunction.  The ejection fraction was 30%.  The LV was dilated with global hypokinesis and mild to moderate MR.  He comes in now to initiate therapy for new severe LV systolic dysfunction and uncontrolled diabetes.    Recent laboratory: TSH normal, CBC normal, direct , A1c 10.9, albumin/creatinine ratio normal, BNP elevated at 186, comprehensive panel notable for blood glucose 340, creatinine 0.71 with EGFR 89.    Current regimen includes aspirin,  Jardiance, but no other cardiac medications.      Specialty Problems          Cardiology Problems    Atrial fibrillation (Multi)    HTN (hypertension)    Benign essential hypertension    Coronary artery disease, non-occlusive    Mid sternal chest pain    Mixed hyperlipidemia    Chronic diastolic heart failure    LBBB (left bundle branch block)      Social History[1]   RX Allergies[2]  Current Outpatient Medications   Medication Instructions    acetaminophen (TylenoL) 325 mg tablet 2 tablets, 4 times daily PRN    aspirin 81 mg chewable tablet 1 tablet, Daily    carvedilol (COREG) 3.125 mg, oral, 2 times daily (morning and late afternoon)    coenzyme Q10-vitamin E 100-5 mg-unit capsule 1  "capsule, Daily    dorzolamide-timoloL (Cosopt) 22.3-6.8 mg/mL ophthalmic solution 1 drop, 2 times daily    empagliflozin (JARDIANCE) 25 mg, oral, Daily    FreeStyle Aristeo 14 Day Sensor kit CHANGE EVERY 14 DAYS    levothyroxine (Synthroid, Levoxyl) 88 mcg tablet 1 tablet, Daily (0630)    Lumigan 0.01 % ophthalmic solution 1 drop, Nightly    multivit-minerals/folic acid (CENTRUM ADULT 50 PLUS ORAL) 1 tablet, Daily    nitroglycerin (NITROSTAT) 0.4 mg, Every 5 min PRN    OneTouch Ultra Test TEST UTD ONCE D    rosuvastatin (CRESTOR) 40 mg, oral, Daily    sacubitriL-valsartan (Entresto) 24-26 mg tablet 1 tablet, oral, 2 times daily    sildenafil (Revatio) 20 mg tablet Take by mouth.    spironolactone (ALDACTONE) 25 mg, oral, Daily    triamcinolone (Kenalog) 0.1 % cream APPLY TWICE DAILY FOR 3 WEEKS AS NEEDED TO RASH AREAS       ROS    Vital Signs:  Vitals:    04/30/25 1420   BP: 126/67   BP Location: Left arm   Patient Position: Sitting   BP Cuff Size: Large adult   Pulse: 74   SpO2: 95%   Weight: 92.7 kg (204 lb 4.8 oz)   Height: 1.854 m (6' 1\")     Wt Readings from Last 2 Encounters:   04/30/25 92.7 kg (204 lb 4.8 oz)   04/03/25 91.9 kg (202 lb 9 oz)     Body mass index is 26.95 kg/m².     Physical Exam:    We noted his vital signs, and also noted the presence of 1+ ankle edema.  Otherwise, he was not examined.     Lab Review:  CBC:  Lab Results   Component Value Date    WBC 8.0 04/03/2025    HGB 15.4 04/03/2025    HCT 45.7 04/03/2025    MCV 94.4 04/03/2025     04/03/2025       CMP:  Recent Labs     04/03/25  0928   GLUCOSE 340*      K 3.9      CO2 26   ANIONGAP 11   BUN 20   CREATININE 0.71   EGFR 89   ALBUMIN 4.2   ALKPHOS 93   PROT 7.1   ALT 17   AST 17   BILITOT 0.6         LIPID PANEL:  Lab Results   Component Value Date    CHOL 228 (H) 04/03/2025       HEME/ENDO:  Lab Results   Component Value Date    HGBA1C 10.9 (H) 04/03/2025    TSH 1.34 04/03/2025         Recent Labs     04/03/25  0928   BNP " 186*     Recent Cardiology Tests:    ECG:    Not performed    Results for orders placed in visit on 04/03/25    ECG 12 lead (Clinic Performed)    Narrative  Normal sinus rhythm  Left axis deviation  Left bundle branch block  Abnormal ECG  When compared with ECG of 05-DEC-2018 14:21,  Left bundle branch block is now Present  Confirmed by Grover Reeves ([id number]) on 4/4/2025 11:54:55 AM       Echo:  Echo Results:  Transthoracic Echo (TTE) Complete With Contrast 04/22/2025    [facility], [address] [place] [address]  Tel [phone] and Fax [phone]    TRANSTHORACIC ECHOCARDIOGRAM REPORT      Patient Name:       LISSETH APOLONIA CHRISTIAN       Reading Physician:    [id number] Glen Murguia MD  Study Date:         4/22/2025           Ordering Provider:    [id number] GROVER REEVES  MRN/PID:            [id number]            Fellow:  Accession#:         [id number]        Nurse:                Gwendolyn Cantu RN  Date of Birth/Age:  1940 / 84     Sonographer:          Christina fung                                     [facility]  Gender assigned at                     Additional Staff:  Birth:  Height:             180.34 cm           Admit Date:  Weight:             91.63 kg            Admission Status:     Outpatient  BSA / BMI:          2.12 m2 / 28.17     Encounter#:           [id number]  kg/m2  Blood Pressure:     137/71 mmHg         Department Location:  [facility]    Study Type:    TRANSTHORACIC ECHO (TTE) COMPLETE  Diagnosis/ICD: Atherosclerotic heart disease of native coronary artery without  angina pectoris-I25.10  Indication:    LBBB; CAD; DM; Dyspnea  CPT Code:      Echo Complete w Full Doppler-[id number]    Patient History:  Diabetes:          Yes  Pertinent History: HTN, Hyperlipidemia, Dyspnea, A-Fib and CAD. S/p catheter  ablation (2009).    Study Detail: The following Echo studies were performed: 2D, M-Mode, Doppler and  color flow. Technically challenging study due to body  habitus and  prominent lung artifact. Definity used as a contrast agent for  endocardial border definition. Total contrast used for this  procedure was 2.5 mL via IV push.      PHYSICIAN INTERPRETATION:  Left Ventricle: Left ventricular ejection fraction is moderately decreased, by visual estimate at 30%. There is global hypokinesis of the left ventricle with minor regional variations. The left ventricular cavity size is mild to moderately dilated. There is normal septal and normal posterior left ventricular wall thickness. Abnormal (paradoxical) septal motion, consistent with left bundle branch block. Spectral Doppler shows a Grade I (impaired relaxation pattern) of left ventricular diastolic filling with normal left atrial filling pressure.  Left Atrium: The left atrial size is normal.  Right Ventricle: The right ventricle is normal in size. There is normal right ventricular global systolic function.  Right Atrium: The right atrium is normal in size.  Aortic Valve: The aortic valve is probably trileaflet. There is mild aortic valve cusp calcification. The aortic valve dimensionless index is 0.99. There is mild aortic valve regurgitation. The peak instantaneous gradient of the aortic valve is 7 mmHg. The mean gradient of the aortic valve is 2 mmHg.  Mitral Valve: The mitral valve is normal in structure. There is mild mitral annular calcification. There is mild to moderate mitral valve regurgitation.  Tricuspid Valve: The tricuspid valve is structurally normal. There is trace tricuspid regurgitation.  Pulmonic Valve: The pulmonic valve is structurally normal. There is trace pulmonic valve regurgitation.  Pericardium: Trivial pericardial effusion.  Aorta: The aortic root is abnormal. The Ao Sinus is 4.40 cm. The Asc Ao is 3.70 cm. There is mild dilatation of the ascending aorta. There is moderate dilatation the aortic root.  Systemic Veins: The inferior vena cava appears normal in size (not well visualized).  In  comparison to the previous echocardiogram(s): Compared with study dated 4/11/2016, EF is now reduced. Aortic root size unchanged.      CONCLUSIONS:  1. Left ventricular cavity size is mild to moderately dilated.  2. Left ventricular ejection fraction is moderately decreased, by visual estimate at 30%.  3. There is global hypokinesis of the left ventricle with minor regional variations.  4. Abnormal septal motion consistent with left bundle branch block.  5. Spectral Doppler shows a Grade I (impaired relaxation pattern) of left ventricular diastolic filling with normal left atrial filling pressure.  6. There is normal right ventricular global systolic function.  7. There is moderate dilatation of the aortic root.  8. Mild aortic valve regurgitation.  9. Mild to moderate mitral valve regurgitation.    QUANTITATIVE DATA SUMMARY:    2D MEASUREMENTS:          Normal Ranges:  IVSd:            0.96 cm  (0.6-1.1cm)  LVPWd:           0.86 cm  (0.6-1.1cm)  LVIDd:           5.73 cm  (3.9-5.9cm)  LVIDs:           4.03 cm  LV Mass Index:   109 g/m2  LVEDV Index:     95 ml/m2  LV % FS          29.7 %      LEFT ATRIUM:                  Normal Ranges:  LA Vol A4C:        24.5 ml    (22+/-6mL/m2)  LA Vol A2C:        77.6 ml  LA Vol BP:         47.8 ml  LA Vol Index A4C:  11.6 ml/m2  LA Vol Index A2C:  36.6 ml/m2  LA Vol Index BP:   22.6 ml/m2  LA Area A4C:       11.4 cm2  LA Area A2C:       22.2 cm2  LA Major Axis A4C: 4.5 cm  LA Major Axis A2C: 5.4 cm  LA Vol A4C:        23.3 ml  LA Vol A2C:        74.2 ml  LA Vol Index BSA:  23.0 ml/m2      RIGHT ATRIUM:                 Normal Ranges:  RA Vol A4C:        34.9 ml    (8.3-19.5ml)  RA Vol Index A4C:  16.5 ml/m2  RA Area A4C:       13.9 cm2  RA Major Axis A4C: 4.7 cm      M-MODE MEASUREMENTS:         Normal Ranges:  Ao Root:             4.40 cm (2.0-3.7cm)      AORTA MEASUREMENTS:         Normal Ranges:  Ao Sinus, d:        4.40 cm (2.1-3.5cm)  Asc Ao, d:          3.70 cm  (2.1-3.4cm)      LV SYSTOLIC FUNCTION:  Normal Ranges:  EF-A4C View:    32 % (>=55%)  EF-A2C View:    28 %  EF-Biplane:     29 %  EF-Visual:      30 %  LV EF Reported: 30 %      LV DIASTOLIC FUNCTION:             Normal Ranges:  MV Peak E:             0.62 m/s    (0.7-1.2 m/s)  MV Peak A:             0.87 m/s    (0.42-0.7 m/s)  E/A Ratio:             0.72        (1.0-2.2)  MV e'                  0.059 m/s   (>8.0)  MV lateral e'          0.08 m/s  MV medial e'           0.04 m/s  MV A Dur:              167.46 msec  E/e' Ratio:            10.43       (<8.0)      MITRAL VALVE:          Normal Ranges:  MV DT:        169 msec (150-240msec)      MITRAL INSUFFICIENCY:             Normal Ranges:  MR VTI:               165.73 cm  MR Vmax:              479.34 cm/s      AORTIC VALVE:                     Normal Ranges:  AoV Vmax:                1.32 m/s (<=1.7m/s)  AoV Peak P.0 mmHg (<20mmHg)  AoV Mean P.5 mmHg (1.7-11.5mmHg)  LVOT Max Lazaro:            0.83 m/s (<=1.1m/s)  AoV VTI:                 17.14 cm (18-25cm)  LVOT VTI:                17.02 cm  LVOT Diameter:           2.25 cm  (1.8-2.4cm)  AoV Area, VTI:           3.82 cm2 (2.5-5.5cm2)  AoV Area,Vmax:           2.42 cm2 (2.5-4.5cm2)  AoV Dimensionless Index: 0.99      AORTIC INSUFFICIENCY:  AI Vmax:       2.13 m/s  AI Half-time:  620 msec  AI Decel Time: 2137 msec  AI Decel Rate: 99.78 cm/s2      RIGHT VENTRICLE:  RV Basal 4.50 cm  RV Mid   3.10 cm  RV Major 7.4 cm  TAPSE:   22.4 mm  RV s'    0.10 m/s      TRICUSPID VALVE/RVSP:         Normal Ranges:  IVC Diam:             1.60 cm      PULMONIC VALVE:          Normal Ranges:  PV Accel Time:  69 msec  (>120ms)  PV Max Lazaro:     0.6 m/s  (0.6-0.9m/s)  PV Max P.7 mmHg      [id number] Glen Murguia MD  Electronically signed on 2025 at 10:23:51 AM        ** Final **       Cath:      Stress Test:  Stress Results:  No results found for this or any previous visit from the past 365 days.          Cardiac Imaging:        Assessment/Plan   The patient presents with newly recognized moderate to severe LV systolic dysfunction.  Catheterization in 2018 did not show obstructive CAD.  He does have uncontrolled diabetes and a mildly elevated BNP.  We will need to initiate GDMT, additional treatment for diabetes, and treatment of hyperlipidemia.    We discussed the importance of daily home blood pressure monitoring with the patient and with his wife.  We added carvedilol 3.125 mg twice daily, Entresto 24-26 mg twice daily, and spironolactone 25 mg daily.  We increased Jardiance from 10 mg to 25 mg daily.  We added rosuvastatin 40 mg daily.  We will obtain blood tests 7 to 10 days after he starts these medications, and we will see him again in 4 weeks.  After medical stabilization, we will likely proceed with diagnostic coronary angiography.  Ultimately, could consider resynchronization therapy with left bundle branch block.    Orders:  Orders Placed This Encounter   Procedures    B-Type Natriuretic Peptide    Renal Function Panel      Followup Appts:  No future appointments.          ____________________________________________________________  Grover Denis MD    Senior Attending Physician  [facility] [place]  [facility]    [facility] for Cardiovascular Excellence  [facility] [employer]         [1]   Social History  Tobacco Use    Smoking status: Former     Types: Cigarettes    Smokeless tobacco: Never   Substance Use Topics    Alcohol use: Not Currently    Drug use: Never   [2]   Allergies  Allergen Reactions    Iodinated Contrast Media Other

## 2025-04-30 ENCOUNTER — OFFICE VISIT (OUTPATIENT)
Dept: CARDIOLOGY | Facility: CLINIC | Age: 85
End: 2025-04-30
Payer: MEDICARE

## 2025-04-30 VITALS
SYSTOLIC BLOOD PRESSURE: 126 MMHG | DIASTOLIC BLOOD PRESSURE: 67 MMHG | BODY MASS INDEX: 27.08 KG/M2 | OXYGEN SATURATION: 95 % | HEART RATE: 74 BPM | WEIGHT: 204.3 LBS | HEIGHT: 73 IN

## 2025-04-30 DIAGNOSIS — E11.9 DIABETES MELLITUS TYPE II, NON INSULIN DEPENDENT (MULTI): ICD-10-CM

## 2025-04-30 DIAGNOSIS — I10 BENIGN ESSENTIAL HYPERTENSION: ICD-10-CM

## 2025-04-30 DIAGNOSIS — I25.10 CORONARY ARTERY DISEASE, NON-OCCLUSIVE: ICD-10-CM

## 2025-04-30 DIAGNOSIS — I50.22 CHRONIC SYSTOLIC HEART FAILURE: Primary | ICD-10-CM

## 2025-04-30 DIAGNOSIS — I44.7 LBBB (LEFT BUNDLE BRANCH BLOCK): ICD-10-CM

## 2025-04-30 PROCEDURE — 3074F SYST BP LT 130 MM HG: CPT | Performed by: INTERNAL MEDICINE

## 2025-04-30 PROCEDURE — 99214 OFFICE O/P EST MOD 30 MIN: CPT | Performed by: INTERNAL MEDICINE

## 2025-04-30 PROCEDURE — 1036F TOBACCO NON-USER: CPT | Performed by: INTERNAL MEDICINE

## 2025-04-30 PROCEDURE — 1159F MED LIST DOCD IN RCRD: CPT | Performed by: INTERNAL MEDICINE

## 2025-04-30 PROCEDURE — 3078F DIAST BP <80 MM HG: CPT | Performed by: INTERNAL MEDICINE

## 2025-04-30 PROCEDURE — 1126F AMNT PAIN NOTED NONE PRSNT: CPT | Performed by: INTERNAL MEDICINE

## 2025-04-30 PROCEDURE — G2211 COMPLEX E/M VISIT ADD ON: HCPCS | Performed by: INTERNAL MEDICINE

## 2025-04-30 PROCEDURE — 99212 OFFICE O/P EST SF 10 MIN: CPT | Performed by: INTERNAL MEDICINE

## 2025-04-30 RX ORDER — SPIRONOLACTONE 25 MG/1
25 TABLET ORAL DAILY
Qty: 30 TABLET | Refills: 11 | Status: SHIPPED | OUTPATIENT
Start: 2025-04-30 | End: 2026-04-30

## 2025-04-30 RX ORDER — CARVEDILOL 3.12 MG/1
3.12 TABLET ORAL
Qty: 60 TABLET | Refills: 11 | Status: SHIPPED | OUTPATIENT
Start: 2025-04-30 | End: 2026-04-30

## 2025-04-30 RX ORDER — ROSUVASTATIN CALCIUM 40 MG/1
40 TABLET, COATED ORAL DAILY
Qty: 30 TABLET | Refills: 11 | Status: SHIPPED | OUTPATIENT
Start: 2025-04-30 | End: 2026-04-30

## 2025-04-30 RX ORDER — SACUBITRIL AND VALSARTAN 24; 26 MG/1; MG/1
1 TABLET, FILM COATED ORAL 2 TIMES DAILY
Qty: 60 TABLET | Refills: 3 | Status: SHIPPED | OUTPATIENT
Start: 2025-04-30 | End: 2025-08-28

## 2025-04-30 ASSESSMENT — ENCOUNTER SYMPTOMS
OCCASIONAL FEELINGS OF UNSTEADINESS: 1
LOSS OF SENSATION IN FEET: 0
DEPRESSION: 0

## 2025-04-30 ASSESSMENT — PAIN SCALES - GENERAL: PAINLEVEL_OUTOF10: 0-NO PAIN

## 2025-04-30 ASSESSMENT — PATIENT HEALTH QUESTIONNAIRE - PHQ9
1. LITTLE INTEREST OR PLEASURE IN DOING THINGS: NOT AT ALL
SUM OF ALL RESPONSES TO PHQ9 QUESTIONS 1 AND 2: 0
2. FEELING DOWN, DEPRESSED OR HOPELESS: NOT AT ALL

## 2025-04-30 NOTE — PATIENT INSTRUCTIONS
1.  Increase Jardiance to 25 mg daily    2.  Begin carvedilol 3.125 mg twice daily, Entresto 24-26 mg twice daily, spironolactone 25 mg once daily in the morning.    3.  Begin rosuvastatin 40 mg daily for management of high cholesterol.    4.  Please obtain requested blood tests 7 to 10 days after starting these new medications.  A requisition has been provided.  Any [facility] laboratory.    5.  Follow-up office visit in 4 to 5 weeks.  Call with any questions.  Record blood pressure every day and bring a series of blood pressure readings to the next office visit.

## 2025-05-16 LAB
ALBUMIN SERPL-MCNC: 4.3 G/DL (ref 3.6–5.1)
BNP SERPL-MCNC: 118 PG/ML
BUN SERPL-MCNC: 21 MG/DL (ref 7–25)
BUN/CREAT SERPL: ABNORMAL (CALC) (ref 6–22)
CALCIUM SERPL-MCNC: 9 MG/DL (ref 8.6–10.3)
CHLORIDE SERPL-SCNC: 102 MMOL/L (ref 98–110)
CO2 SERPL-SCNC: 25 MMOL/L (ref 20–32)
CREAT SERPL-MCNC: 0.77 MG/DL (ref 0.7–1.22)
EGFRCR SERPLBLD CKD-EPI 2021: 87 ML/MIN/1.73M2
GLUCOSE SERPL-MCNC: 309 MG/DL (ref 65–139)
PHOSPHATE SERPL-MCNC: 3.5 MG/DL (ref 2.1–4.3)
POTASSIUM SERPL-SCNC: 4.5 MMOL/L (ref 3.5–5.3)
SODIUM SERPL-SCNC: 138 MMOL/L (ref 135–146)

## 2025-06-04 ENCOUNTER — APPOINTMENT (OUTPATIENT)
Dept: CARDIOLOGY | Facility: CLINIC | Age: 85
End: 2025-06-04
Payer: MEDICARE

## 2025-06-04 VITALS
HEART RATE: 78 BPM | OXYGEN SATURATION: 96 % | BODY MASS INDEX: 26.48 KG/M2 | HEIGHT: 73 IN | DIASTOLIC BLOOD PRESSURE: 52 MMHG | SYSTOLIC BLOOD PRESSURE: 88 MMHG | WEIGHT: 199.8 LBS

## 2025-06-04 DIAGNOSIS — I25.10 CORONARY ARTERY DISEASE, NON-OCCLUSIVE: ICD-10-CM

## 2025-06-04 DIAGNOSIS — I50.22 CHRONIC HEART FAILURE WITH REDUCED EJECTION FRACTION (HFREF, <= 40%): ICD-10-CM

## 2025-06-04 DIAGNOSIS — E11.65 TYPE 2 DIABETES MELLITUS WITH HYPERGLYCEMIA, WITHOUT LONG-TERM CURRENT USE OF INSULIN: ICD-10-CM

## 2025-06-04 DIAGNOSIS — I48.91 ATRIAL FIBRILLATION, UNSPECIFIED TYPE (MULTI): Primary | ICD-10-CM

## 2025-06-04 PROCEDURE — G2211 COMPLEX E/M VISIT ADD ON: HCPCS | Performed by: INTERNAL MEDICINE

## 2025-06-04 PROCEDURE — 1126F AMNT PAIN NOTED NONE PRSNT: CPT | Performed by: INTERNAL MEDICINE

## 2025-06-04 PROCEDURE — 1160F RVW MEDS BY RX/DR IN RCRD: CPT | Performed by: INTERNAL MEDICINE

## 2025-06-04 PROCEDURE — 3074F SYST BP LT 130 MM HG: CPT | Performed by: INTERNAL MEDICINE

## 2025-06-04 PROCEDURE — 3078F DIAST BP <80 MM HG: CPT | Performed by: INTERNAL MEDICINE

## 2025-06-04 PROCEDURE — 1159F MED LIST DOCD IN RCRD: CPT | Performed by: INTERNAL MEDICINE

## 2025-06-04 PROCEDURE — 99214 OFFICE O/P EST MOD 30 MIN: CPT | Performed by: INTERNAL MEDICINE

## 2025-06-04 PROCEDURE — 1036F TOBACCO NON-USER: CPT | Performed by: INTERNAL MEDICINE

## 2025-06-04 RX ORDER — SEMAGLUTIDE 0.68 MG/ML
0.25 INJECTION, SOLUTION SUBCUTANEOUS WEEKLY
Qty: 3 ML | Refills: 3 | Status: SHIPPED | OUTPATIENT
Start: 2025-06-04 | End: 2026-01-14

## 2025-06-04 ASSESSMENT — PAIN SCALES - GENERAL: PAINLEVEL_OUTOF10: 0-NO PAIN

## 2025-06-04 ASSESSMENT — ENCOUNTER SYMPTOMS
LOSS OF SENSATION IN FEET: 0
DEPRESSION: 0
OCCASIONAL FEELINGS OF UNSTEADINESS: 1

## 2025-06-04 NOTE — PROGRESS NOTES
Primary Care Physician: Alex Borges MD  Date of Visit: 06/04/2025  4:20 PM EDT  Location of visit: [facility] [place] [address]   Last office visit: 4/30/2025    Chief Complaint:     Follow-up HFrEF (1 month follow-up) left bundle branch block    HPI/Summary  Jr Grant is a 84 y.o. male who presents for followup cardiology evaluation.     The patient recently reestablished cardiology care.  We had seen him in January, 2019 and at that time we recognize hypertension, hyperlipidemia, and a prior history of atrial fibrillation status post catheter ablation in 2009.  In 2018, he presented with exertional chest discomfort.  Catheterization on December 21, 2018 showed no aortic stenosis, and elevated LVEDP, and a 10 to 30% proximal LAD.  Diabetes was diagnosed a few years ago, and his primary care provider initiated treatment with Jardiance.  We obtained an echocardiogram on April 22, 2025 that showed new severe LV systolic dysfunction with an ejection fraction of 30%, global hypokinesis, mild LV dilatation, normal RV function and mild to moderate MR.    At his last visit, we increased Jardiance to 25 mg daily, added carvedilol 3.125 mg twice daily, Entresto 24-26 mg twice daily, spironolactone 25 mg daily and rosuvastatin 40 mg daily.  He apparently did not tolerate metformin.    Repeat laboratory testing on May 15, 2025 showed a blood glucose of 309, creatinine 0.77, normal potassium.  BNP was 118.  Direct LDL was 130 prior to initiation of statin therapy.  Hemoglobin A1c was 10.9 2 months ago.    The patient has been monitoring blood pressures at home, readings are around 120 systolic and between 60 and 70 diastolic.  Blood sugars are still around 200 mg/dL.  Today, the office blood pressure was much lower than home readings.    Specialty Problems          Cardiology Problems    Atrial fibrillation (Multi)    HTN (hypertension)    Benign essential hypertension    Coronary artery disease, non-occlusive    Mid sternal  "chest pain    Mixed hyperlipidemia    Chronic diastolic heart failure    LBBB (left bundle branch block)      Social History[1]   RX Allergies[2]  Current Outpatient Medications   Medication Instructions    acetaminophen (TylenoL) 325 mg tablet 2 tablets, 4 times daily PRN    aspirin 81 mg chewable tablet 1 tablet, Daily    carvedilol (COREG) 3.125 mg, oral, 2 times daily (morning and late afternoon)    coenzyme Q10-vitamin E 100-5 mg-unit capsule 1 capsule, Daily    dorzolamide-timoloL (Cosopt) 22.3-6.8 mg/mL ophthalmic solution 1 drop, 2 times daily    empagliflozin (JARDIANCE) 25 mg, oral, Daily    FreeStyle Aristeo 14 Day Sensor kit CHANGE EVERY 14 DAYS    levothyroxine (Synthroid, Levoxyl) 88 mcg tablet 1 tablet, Daily (0630)    Lumigan 0.01 % ophthalmic solution 1 drop, Nightly    multivit-minerals/folic acid (CENTRUM ADULT 50 PLUS ORAL) 1 tablet, Daily    nitroglycerin (NITROSTAT) 0.4 mg, Every 5 min PRN    OneTouch Ultra Test TEST UTD ONCE D    Ozempic 0.25 mg, subcutaneous, Weekly    rosuvastatin (CRESTOR) 40 mg, oral, Daily    sacubitriL-valsartan (Entresto) 24-26 mg tablet 1 tablet, oral, 2 times daily    spironolactone (ALDACTONE) 25 mg, oral, Daily    triamcinolone (Kenalog) 0.1 % cream APPLY TWICE DAILY FOR 3 WEEKS AS NEEDED TO RASH AREAS       ROS    Vital Signs:  Vitals:    06/04/25 1629   BP: 88/52   Pulse: 78   SpO2: 96%   Weight: 90.6 kg (199 lb 12.8 oz)   Height: 1.854 m (6' 1\")     Wt Readings from Last 2 Encounters:   06/04/25 90.6 kg (199 lb 12.8 oz)   04/30/25 92.7 kg (204 lb 4.8 oz)     Body mass index is 26.36 kg/m².     Physical Exam:    Pleasant and cooperative.  Lung fields clear.  Heart sounds regular, no murmurs appreciated.  Abdomen nontender to palpation.  Still 2+ edema lower extremities.     Lab Review:  CBC:  Lab Results   Component Value Date    WBC 8.0 04/03/2025    HGB 15.4 04/03/2025    HCT 45.7 04/03/2025    MCV 94.4 04/03/2025     04/03/2025       CMP:  Recent Labs     " 05/15/25  1043 04/03/25  0928   GLUCOSE 309* 340*    137   K 4.5 3.9    100   CO2 25 26   ANIONGAP  --  11   BUN 21 20   CREATININE 0.77 0.71   EGFR 87 89   ALBUMIN 4.3 4.2   ALKPHOS  --  93   PROT  --  7.1   ALT  --  17   AST  --  17   BILITOT  --  0.6         LIPID PANEL:  Lab Results   Component Value Date    CHOL 228 (H) 04/03/2025       HEME/ENDO:  Lab Results   Component Value Date    HGBA1C 10.9 (H) 04/03/2025    TSH 1.34 04/03/2025         Recent Labs     05/15/25  1043 04/03/25  0928   * 186*     Recent Cardiology Tests:    ECG:    Not performed today    Results for orders placed in visit on 04/03/25    ECG 12 lead (Clinic Performed)    Narrative  Normal sinus rhythm  Left axis deviation  Left bundle branch block  Abnormal ECG  When compared with ECG of 05-DEC-2018 14:21,  Left bundle branch block is now Present  Confirmed by Grover Reeves ([id number]) on 4/4/2025 11:54:55 AM       Echo:  Echo Results:  Transthoracic Echo (TTE) Complete With Contrast 04/22/2025    [facility], [address] [place] [address]  Tel [phone] and Fax [phone]    TRANSTHORACIC ECHOCARDIOGRAM REPORT      Patient Name:       LISSETH Starks Physician:    [id number] Glen Murguia MD  Study Date:         4/22/2025           Ordering Provider:    [id number] GROVER REEVES  MRN/PID:            [id number]            Fellow:  Accession#:         [id number]        Nurse:                Gwendolyn Cantu RN  Date of Birth/Age:  1940 / 84     Sonographer:          Christina fung                                     [id number]  Gender assigned at  M                   Additional Staff:  Birth:  Height:             180.34 cm           Admit Date:  Weight:             91.63 kg            Admission Status:     Outpatient  BSA / BMI:          2.12 m2 / 28.17     Encounter#:           [id number]  kg/m2  Blood Pressure:     137/71 mmHg         Department Location:  Section  Echo  Lab    Study Type:    TRANSTHORACIC ECHO (TTE) COMPLETE  Diagnosis/ICD: Atherosclerotic heart disease of native coronary artery without  angina pectoris-I25.10  Indication:    LBBB; CAD; DM; Dyspnea  CPT Code:      Echo Complete w Full [id number]    Patient History:  Diabetes:          Yes  Pertinent History: HTN, Hyperlipidemia, Dyspnea, A-Fib and CAD. S/p catheter  ablation (2009).    Study Detail: The following Echo studies were performed: 2D, M-Mode, Doppler and  color flow. Technically challenging study due to body habitus and  prominent lung artifact. Definity used as a contrast agent for  endocardial border definition. Total contrast used for this  procedure was 2.5 mL via IV push.      PHYSICIAN INTERPRETATION:  Left Ventricle: Left ventricular ejection fraction is moderately decreased, by visual estimate at 30%. There is global hypokinesis of the left ventricle with minor regional variations. The left ventricular cavity size is mild to moderately dilated. There is normal septal and normal posterior left ventricular wall thickness. Abnormal (paradoxical) septal motion, consistent with left bundle branch block. Spectral Doppler shows a Grade I (impaired relaxation pattern) of left ventricular diastolic filling with normal left atrial filling pressure.  Left Atrium: The left atrial size is normal.  Right Ventricle: The right ventricle is normal in size. There is normal right ventricular global systolic function.  Right Atrium: The right atrium is normal in size.  Aortic Valve: The aortic valve is probably trileaflet. There is mild aortic valve cusp calcification. The aortic valve dimensionless index is 0.99. There is mild aortic valve regurgitation. The peak instantaneous gradient of the aortic valve is 7 mmHg. The mean gradient of the aortic valve is 2 mmHg.  Mitral Valve: The mitral valve is normal in structure. There is mild mitral annular calcification. There is mild to moderate mitral valve  regurgitation.  Tricuspid Valve: The tricuspid valve is structurally normal. There is trace tricuspid regurgitation.  Pulmonic Valve: The pulmonic valve is structurally normal. There is trace pulmonic valve regurgitation.  Pericardium: Trivial pericardial effusion.  Aorta: The aortic root is abnormal. The Ao Sinus is 4.40 cm. The Asc Ao is 3.70 cm. There is mild dilatation of the ascending aorta. There is moderate dilatation the aortic root.  Systemic Veins: The inferior vena cava appears normal in size (not well visualized).  In comparison to the previous echocardiogram(s): Compared with study dated 4/11/2016, EF is now reduced. Aortic root size unchanged.      CONCLUSIONS:  1. Left ventricular cavity size is mild to moderately dilated.  2. Left ventricular ejection fraction is moderately decreased, by visual estimate at 30%.  3. There is global hypokinesis of the left ventricle with minor regional variations.  4. Abnormal septal motion consistent with left bundle branch block.  5. Spectral Doppler shows a Grade I (impaired relaxation pattern) of left ventricular diastolic filling with normal left atrial filling pressure.  6. There is normal right ventricular global systolic function.  7. There is moderate dilatation of the aortic root.  8. Mild aortic valve regurgitation.  9. Mild to moderate mitral valve regurgitation.    QUANTITATIVE DATA SUMMARY:    2D MEASUREMENTS:          Normal Ranges:  IVSd:            0.96 cm  (0.6-1.1cm)  LVPWd:           0.86 cm  (0.6-1.1cm)  LVIDd:           5.73 cm  (3.9-5.9cm)  LVIDs:           4.03 cm  LV Mass Index:   109 g/m2  LVEDV Index:     95 ml/m2  LV % FS          29.7 %      LEFT ATRIUM:                  Normal Ranges:  LA Vol A4C:        24.5 ml    (22+/-6mL/m2)  LA Vol A2C:        77.6 ml  LA Vol BP:         47.8 ml  LA Vol Index A4C:  11.6 ml/m2  LA Vol Index A2C:  36.6 ml/m2  LA Vol Index BP:   22.6 ml/m2  LA Area A4C:       11.4 cm2  LA Area A2C:       22.2 cm2  LA  Major Axis A4C: 4.5 cm  LA Major Axis A2C: 5.4 cm  LA Vol A4C:        23.3 ml  LA Vol A2C:        74.2 ml  LA Vol Index BSA:  23.0 ml/m2      RIGHT ATRIUM:                 Normal Ranges:  RA Vol A4C:        34.9 ml    (8.3-19.5ml)  RA Vol Index A4C:  16.5 ml/m2  RA Area A4C:       13.9 cm2  RA Major Axis A4C: 4.7 cm      M-MODE MEASUREMENTS:         Normal Ranges:  Ao Root:             4.40 cm (2.0-3.7cm)      AORTA MEASUREMENTS:         Normal Ranges:  Ao Sinus, d:        4.40 cm (2.1-3.5cm)  Asc Ao, d:          3.70 cm (2.1-3.4cm)      LV SYSTOLIC FUNCTION:  Normal Ranges:  EF-A4C View:    32 % (>=55%)  EF-A2C View:    28 %  EF-Biplane:     29 %  EF-Visual:      30 %  LV EF Reported: 30 %      LV DIASTOLIC FUNCTION:             Normal Ranges:  MV Peak E:             0.62 m/s    (0.7-1.2 m/s)  MV Peak A:             0.87 m/s    (0.42-0.7 m/s)  E/A Ratio:             0.72        (1.0-2.2)  MV e'                  0.059 m/s   (>8.0)  MV lateral e'          0.08 m/s  MV medial e'           0.04 m/s  MV A Dur:              167.46 msec  E/e' Ratio:            10.43       (<8.0)      MITRAL VALVE:          Normal Ranges:  MV DT:        169 msec (150-240msec)      MITRAL INSUFFICIENCY:             Normal Ranges:  MR VTI:               165.73 cm  MR Vmax:              479.34 cm/s      AORTIC VALVE:                     Normal Ranges:  AoV Vmax:                1.32 m/s (<=1.7m/s)  AoV Peak P.0 mmHg (<20mmHg)  AoV Mean P.5 mmHg (1.7-11.5mmHg)  LVOT Max Lazaro:            0.83 m/s (<=1.1m/s)  AoV VTI:                 17.14 cm (18-25cm)  LVOT VTI:                17.02 cm  LVOT Diameter:           2.25 cm  (1.8-2.4cm)  AoV Area, VTI:           3.82 cm2 (2.5-5.5cm2)  AoV Area,Vmax:           2.42 cm2 (2.5-4.5cm2)  AoV Dimensionless Index: 0.99      AORTIC INSUFFICIENCY:  AI Vmax:       2.13 m/s  AI Half-time:  620 msec  AI Decel Time: 2137 msec  AI Decel Rate: 99.78 cm/s2      RIGHT VENTRICLE:  RV  Basal 4.50 cm  RV Mid   3.10 cm  RV Major 7.4 cm  TAPSE:   22.4 mm  RV s'    0.10 m/s      TRICUSPID VALVE/RVSP:         Normal Ranges:  IVC Diam:             1.60 cm      PULMONIC VALVE:          Normal Ranges:  PV Accel Time:  69 msec  (>120ms)  PV Max Lazaro:     0.6 m/s  (0.6-0.9m/s)  PV Max P.7 mmHg      [id number] Glen Murguia MD  Electronically signed on 2025 at 10:23:51 AM        ** Final **       Cath:      Stress Test:  Stress Results:  No results found for this or any previous visit from the past 365 days.         Cardiac Imaging:        Assessment/Plan   The patient has tolerated initial treatment with high-dose statin therapy, Entresto, spironolactone, and carvedilol.  The office blood pressure is quite low, will not titrate further as yet.  Despite high-dose Jardiance, blood sugars are uncontrolled.  Will add Ozempic 0.25 mg weekly, with a clinical pharmacy consultation.    He will return in 8 weeks, and prior to the visit we will check a BNP and a lipid panel.  We may be able to uptitrate his medication further at that time.  If he is tolerating Ozempic, we will consider cardiac catheterization to further evaluate severe LV systolic dysfunction, but first we will likely obtain a limited echocardiogram to assess for any interval improvement.    Orders:  Orders Placed This Encounter   Procedures    B-Type Natriuretic Peptide    Lipid Panel    Referral to Clinical Pharmacy      Followup Appts:  No future appointments.          ____________________________________________________________  Grover Denis MD    Senior Attending Physician  [facility]  [facility] Chair for Cardiovascular Excellence  [facility] [employer] [facility] [employer]         [1]   Social History  Tobacco Use    Smoking status: Former     Types: Cigarettes    Smokeless tobacco: Never   Substance Use Topics    Alcohol use: Not Currently    Drug  use: Never   [2]   Allergies  Allergen Reactions    Iodinated Contrast Media Other

## 2025-06-05 ENCOUNTER — APPOINTMENT (OUTPATIENT)
Dept: CARDIOLOGY | Facility: CLINIC | Age: 85
End: 2025-06-05
Payer: MEDICARE

## 2025-06-13 NOTE — PROGRESS NOTES
"  Pharmacist Clinic: Cardiology Management    Jr Grant is a 84 y.o. male was referred to Clinical Pharmacy Team for diabetes management.     Referring Provider: Grover Denis MD    THIS IS A NEW PATIENT APPOINTMENT. PATIENT WILL BE ESTABLISHING CARE WITH CLINICAL PHARMACY.    Appointment was completed by Jr who was reached at primary number.    Allergies Reviewed? Yes    Allergies[1]    Medical History[2]    Medications Ordered Prior to Encounter[3]      RELEVANT LAB RESULTS:  Lab Results   Component Value Date    BILITOT 0.6 04/03/2025    CALCIUM 9.0 05/15/2025    CO2 25 05/15/2025     05/15/2025    CREATININE 0.77 05/15/2025    GLUCOSE 309 (H) 05/15/2025    ALKPHOS 93 04/03/2025    K 4.5 05/15/2025    PROT 7.1 04/03/2025     05/15/2025    AST 17 04/03/2025    ALT 17 04/03/2025    BUN 21 05/15/2025    ANIONGAP 11 04/03/2025    PHOS 3.5 05/15/2025    ALBUMIN 4.3 05/15/2025     Lab Results   Component Value Date    CHOL 228 (H) 04/03/2025     No results found for: \"BMCBC\", \"CBCDIF\"     PHARMACEUTICAL ASSESSMENT:    MEDICATION RECONCILIATION    Was a medication reconciliation completed at this visit? Yes  Home Pharmacy Reviewed? Yes, describe: [facility]     Changed:  - prn triamcinolone     Drug Interactions? No    Medication Documentation Review Audit       Reviewed by Belen Wilder, PharmD (Pharmacist) on 06/16/25 at 1444      Medication Order Taking? Sig Documenting Provider Last Dose Status   acetaminophen (TylenoL) 325 mg tablet [id number] Yes Take 2 tablets (650 mg) by mouth 4 times a day as needed. Historical Provider, MD  Active   aspirin 81 mg chewable tablet [id number] Yes Chew 1 tablet (81 mg) once daily. Historical Provider, MD  Active   carvedilol (Coreg) 3.125 mg tablet [id number] Yes Take 1 tablet (3.125 mg) by mouth 2 times daily (morning and late afternoon). Grover Denis MD  Active   coenzyme Q10-vitamin E 100-5 mg-unit capsule [id number] Yes Take 1 capsule by " mouth once daily. Historical Provider, MD  Active   dorzolamide-timoloL (Cosopt) 22.3-6.8 mg/mL ophthalmic solution [id number] Yes Administer 1 drop into the left eye 2 times a day. Historical Provider, MD  Active   empagliflozin (Jardiance) 25 mg tablet [id number] Yes Take 1 tablet (25 mg) by mouth once daily. Grover Denis MD  Active   FreeStyle Aristeo 14 Day Sensor kit [id number] Yes CHANGE EVERY 14 DAYS Historical Provider, MD  Active   levothyroxine (Synthroid, Levoxyl) 88 mcg tablet [id number] Yes Take 1 tablet (88 mcg) by mouth early in the morning.. Historical Provider, MD  Active   Lumigan 0.01 % ophthalmic solution [id number] Yes Administer 1 drop into both eyes once daily at bedtime. Historical Provider, MD  Active   multivit-minerals/folic acid (CENTRUM ADULT 50 PLUS ORAL) [id number] Yes Take 1 tablet by mouth once daily. Historical Provider, MD  Active   nitroglycerin (Nitrostat) 0.4 mg SL tablet [id number] Yes Place 1 tablet (0.4 mg) under the tongue every 5 minutes if needed for chest pain. Historical Provider, MD  Active   OneTouch Ultra Test [id number]  TEST UTD ONCE D Historical Provider, MD  Active   rosuvastatin (Crestor) 40 mg tablet [id number] Yes Take 1 tablet (40 mg) by mouth once daily. Grover Denis MD  Active   sacubitriL-valsartan (Entresto) 24-26 mg tablet [id number] Yes Take 1 tablet by mouth 2 times a day. Grover Denis MD  Active   semaglutide (Ozempic) 0.25 mg or 0.5 mg (2 mg/3 mL) pen injector [id number]  Inject 0.25 mg under the skin 1 (one) time per week.   Patient not taking: Reported on 6/16/2025    Grover Denis MD  Active   spironolactone (Aldactone) 25 mg tablet [id number] Yes Take 1 tablet (25 mg) by mouth once daily. Grover Denis MD  Active   triamcinolone (Kenalog) 0.1 % cream [id number] Yes APPLY TWICE DAILY FOR 3 WEEKS AS NEEDED TO RASH AREAS   Patient taking differently: Apply 1 Application topically 2 times a day as needed for irritation or rash.    Historical Provider, MD   Active                    DISEASE MANAGEMENT ASSESSMENT:     DIABETES ASSESSMENT     PMH REVIEW:  - PMH of Pancreatitis: No  - PMH of Retinopathy: No  - PMH of Urinary Tract Infections: No  - PMH of MTC: No    HEALTH MAINTENANCE:   Foot Exam: has not gotten one will check with PCP  Eye Exam: Yes yearly   Lipid Panel:  (4/3/25)  Urine Albumin: will order has not had completed yet     PRIMARY/SECONDARY PREVENTION:   - Statin? Yes - rosuvastatin 40mg every day   - ACE-I/ARB? Yes - Entresto 24/26mg BID   - Aspirin? Yes    Last Recorded Vitals:  BP Readings from Last 6 Encounters:   06/04/25 88/52   04/30/25 126/67   04/03/25 129/83       Wt Readings from Last 6 Encounters:   06/04/25 90.6 kg (199 lb 12.8 oz)   04/30/25 92.7 kg (204 lb 4.8 oz)   04/03/25 91.9 kg (202 lb 9 oz)       CURRENT PHARMACOTHERAPY:    Jardiance 25mg every day     Affordability/Accessibility: $0/month   Adherence/Organization: reports adherence   Adverse Effects:   Tolerating the increase in Jardiance with no complaints     The patient does not have a known family history of diabetes.    Current diet: limiting carbohydrates, diet pop, peanut butter for snacks   Current exercise: no regular exercise    Current monitoring regimen:   Patient is using: continuous glucose monitor    Date Pre-Breakfast Pre-Lunch Pre-Dinner Pre-Bedtime   6/16/25 188      6/15/25   255 226          Average         Any episodes of hypoglycemia? no    Lab Results   Component Value Date    HGBA1C 10.9 (H) 04/03/2025       Patients diabetes is poorly controlled with most recent A1c of 10.9% (Goal < 8%).         DISCUSSION/NOTES:   Today was an initial visit to establish with clinical pharmacy. Patient medications and allergies were reviewed and updated.  Patient has poorly controlled T2DM. His BGs are normally around 200-250. With age goal A1C of < 8.   Patient was prescribed ozempic but has not started medication. EDU on medication/administration/dosing/side effects  given to patient and wife. [facility] who will schedule delivery of medication for patient. Patient to start ASAP once medication received.   Hopeful with increase in Jardiance and starting of Ozempic we can decrease patient's A1c to goal without insulin.   Patient has had recent labs but no recently urine albumin/creatinine ratio, order placed.   Follow up in 1 month for medication dose adjustment and monitor BGs.     ASSESSMENT:    Assessment/Plan   Problem List Items Addressed This Visit       Diabetes mellitus (Multi)    A1C 10.9 (4/3/25) goal A1C < 8.   CONTINUE Jardiance 25mg every day and START ozempic 0.25mg q7d  Monitor A1c, BGs, and GI side effects          Relevant Medications    nitroglycerin (Nitrostat) 0.4 mg SL tablet    Other Relevant Orders    Referral to Clinical Pharmacy    Albumin-Creatinine Ratio, Urine Random         RECOMMENDATIONS/PLAN:    CONTINUE  Jardiance 25mg every day  START  Ozempic 0.25mg subcutaneous q7d  Follow up in 1 month for lab results and possible medication dose adjustment.     Last Appnt with Referring Provider: 6/4/25  Next Appnt with Referring Provider: 8/6/25  Clinical Pharmacist follow up: 7/21/25  Type of Encounter: Virtual    Mary HarpD    Verbal consent to manage patient's drug therapy was obtained from the patient . They were informed they may decline to participate or withdraw from participation in pharmacy services at any time.    Continue all meds under the continuation of care with the referring provider and clinical pharmacy team.           [1]   Allergies  Allergen Reactions    Iodinated Contrast Media Other   [2]   Past Medical History:  Diagnosis Date    Abnormal electrocardiogram (ECG) (EKG) 10/23/2013    Abnormal electrocardiogram   [3]   Current Outpatient Medications on File Prior to Visit   Medication Sig Dispense Refill    acetaminophen (TylenoL) 325 mg tablet Take 2 tablets (650 mg) by mouth 4 times a day as needed.       aspirin 81 mg chewable tablet Chew 1 tablet (81 mg) once daily.      carvedilol (Coreg) 3.125 mg tablet Take 1 tablet (3.125 mg) by mouth 2 times daily (morning and late afternoon). 60 tablet 11    coenzyme Q10-vitamin E 100-5 mg-unit capsule Take 1 capsule by mouth once daily.      dorzolamide-timoloL (Cosopt) 22.3-6.8 mg/mL ophthalmic solution Administer 1 drop into the left eye 2 times a day.      empagliflozin (Jardiance) 25 mg tablet Take 1 tablet (25 mg) by mouth once daily. 30 tablet 11    FreeStyle Aristeo 14 Day Sensor kit CHANGE EVERY 14 DAYS      levothyroxine (Synthroid, Levoxyl) 88 mcg tablet Take 1 tablet (88 mcg) by mouth early in the morning..      Lumigan 0.01 % ophthalmic solution Administer 1 drop into both eyes once daily at bedtime.      multivit-minerals/folic acid (CENTRUM ADULT 50 PLUS ORAL) Take 1 tablet by mouth once daily.      rosuvastatin (Crestor) 40 mg tablet Take 1 tablet (40 mg) by mouth once daily. 30 tablet 11    sacubitriL-valsartan (Entresto) 24-26 mg tablet Take 1 tablet by mouth 2 times a day. 60 tablet 3    spironolactone (Aldactone) 25 mg tablet Take 1 tablet (25 mg) by mouth once daily. 30 tablet 11    triamcinolone (Kenalog) 0.1 % cream APPLY TWICE DAILY FOR 3 WEEKS AS NEEDED TO RASH AREAS (Patient taking differently: Apply 1 Application topically 2 times a day as needed for irritation or rash.)      [DISCONTINUED] nitroglycerin (Nitrostat) 0.4 mg SL tablet Place 1 tablet (0.4 mg) under the tongue every 5 minutes if needed for chest pain.      OneTouch Ultra Test TEST UTD ONCE D      semaglutide (Ozempic) 0.25 mg or 0.5 mg (2 mg/3 mL) pen injector Inject 0.25 mg under the skin 1 (one) time per week. (Patient not taking: Reported on 6/16/2025) 3 mL 3     No current facility-administered medications on file prior to visit.

## 2025-06-16 ENCOUNTER — APPOINTMENT (OUTPATIENT)
Dept: PHARMACY | Facility: HOSPITAL | Age: 85
End: 2025-06-16
Payer: MEDICARE

## 2025-06-16 DIAGNOSIS — E11.65 TYPE 2 DIABETES MELLITUS WITH HYPERGLYCEMIA, WITHOUT LONG-TERM CURRENT USE OF INSULIN: ICD-10-CM

## 2025-06-16 PROCEDURE — RXMED WILLOW AMBULATORY MEDICATION CHARGE

## 2025-06-16 RX ORDER — NITROGLYCERIN 0.4 MG/1
0.4 TABLET SUBLINGUAL EVERY 5 MIN PRN
Qty: 30 TABLET | Refills: 1 | Status: SHIPPED | OUTPATIENT
Start: 2025-06-16

## 2025-06-16 NOTE — Clinical Note
Mario, Spoke with Jr and his wife today. His BGs have been 200-250 normally. He has not started the ozempic. I called the pharmacy and they are shipping patient the medication. He will start asap. [facility] on medication was provided. I will follow up in 1 month to hopefully increase ozempic dose. Patient will continue to monitor BGs and continue to limit carbs in diet. Thank you for the consult.

## 2025-06-16 NOTE — ASSESSMENT & PLAN NOTE
A1C 10.9 (4/3/25) goal A1C < 8.   CONTINUE Jardiance 25mg every day and START ozempic 0.25mg q7d  Monitor A1c, BGs, and GI side effects

## 2025-06-17 ENCOUNTER — PHARMACY VISIT (OUTPATIENT)
Dept: PHARMACY | Facility: CLINIC | Age: 85
End: 2025-06-17
Payer: COMMERCIAL

## 2025-07-17 NOTE — PROGRESS NOTES
"  Pharmacist Clinic: Cardiology Management    Jr Grant is a 84 y.o. male was referred to Clinical Pharmacy Team for diabetes management.     Referring Provider: Grover Denis MD    THIS IS A FOLLOW UP PATIENT APPOINTMENT. AT LAST VISIT ON 6/16/25 WITH PHARMACIST (Belen Wilder).    Appointment was completed by Clyde and wife Eryn who was reached at primary number.    REVIEW OF PAST APPNT (IF APPLICABLE):   Last visit was an initial visit to establish with clinical pharmacy. Patient medications and allergies were reviewed and updated.  Patient has poorly controlled T2DM. His BGs are normally around 200-250. With age goal A1C of < 8.   Patient was prescribed ozempic but has not started medication. EDU on medication/administration/dosing/side effects given to patient and wife. [facility] who will schedule delivery of medication for patient. Patient to start ASAP once medication received.   Hopeful with increase in Jardiance and starting of Ozempic we can decrease patient's A1c to goal without insulin.   Patient has had recent labs but no recently urine albumin/creatinine ratio, order placed.   Follow up in 1 month for medication dose adjustment and monitor BGs.     Allergies Reviewed? No    Allergies[1]    Medical History[2]    Medications Ordered Prior to Encounter[3]      RELEVANT LAB RESULTS:  Lab Results   Component Value Date    BILITOT 0.6 04/03/2025    CALCIUM 9.0 05/15/2025    CO2 25 05/15/2025     05/15/2025    CREATININE 0.77 05/15/2025    GLUCOSE 309 (H) 05/15/2025    ALKPHOS 93 04/03/2025    K 4.5 05/15/2025    PROT 7.1 04/03/2025     05/15/2025    AST 17 04/03/2025    ALT 17 04/03/2025    BUN 21 05/15/2025    ANIONGAP 11 04/03/2025    PHOS 3.5 05/15/2025    ALBUMIN 4.3 05/15/2025     Lab Results   Component Value Date    CHOL 228 (H) 04/03/2025     No results found for: \"BMCBC\", \"CBCDIF\"     PHARMACEUTICAL ASSESSMENT:    MEDICATION RECONCILIATION    Was a medication " reconciliation completed at this visit? No  Home Pharmacy Reviewed? Yes, describe: [facility]    Drug Interactions? No    Medication Documentation Review Audit       Reviewed by Belen Wilder, PharmD (Pharmacist) on 06/16/25 at 1444      Medication Order Taking? Sig Documenting Provider Last Dose Status   acetaminophen (TylenoL) 325 mg tablet [id number] Yes Take 2 tablets (650 mg) by mouth 4 times a day as needed. Historical Provider, MD  Active   aspirin 81 mg chewable tablet [id number] Yes Chew 1 tablet (81 mg) once daily. Historical Provider, MD  Active   carvedilol (Coreg) 3.125 mg tablet [id number] Yes Take 1 tablet (3.125 mg) by mouth 2 times daily (morning and late afternoon). Grover Denis MD  Active   coenzyme Q10-vitamin E 100-5 mg-unit capsule [id number] Yes Take 1 capsule by mouth once daily. Historical Provider, MD  Active   dorzolamide-timoloL (Cosopt) 22.3-6.8 mg/mL ophthalmic solution [id number] Yes Administer 1 drop into the left eye 2 times a day. Historical Provider, MD  Active   empagliflozin (Jardiance) 25 mg tablet [id number] Yes Take 1 tablet (25 mg) by mouth once daily. Grover Denis MD  Active   FreeStNanoOptoe 14 Day Sensor kit [id number] Yes CHANGE EVERY 14 DAYS Historical Provider, MD  Active   levothyroxine (Synthroid, Levoxyl) 88 mcg tablet [id number] Yes Take 1 tablet (88 mcg) by mouth early in the morning.. Historical Provider, MD  Active   Lumigan 0.01 % ophthalmic solution [id number] Yes Administer 1 drop into both eyes once daily at bedtime. Historical Provider, MD  Active   multivit-minerals/folic acid (CENTRUM ADULT 50 PLUS ORAL) [id number] Yes Take 1 tablet by mouth once daily. Historical Provider, MD  Active   nitroglycerin (Nitrostat) 0.4 mg SL tablet [id number] Yes Place 1 tablet (0.4 mg) under the tongue every 5 minutes if needed for chest pain. Historical Provider, MD  Active   OneTouch Ultra Test [id number]  TEST UTD ONCE D Historical MD Flores  Active   rosuvastatin  (Crestor) 40 mg tablet [id number] Yes Take 1 tablet (40 mg) by mouth once daily. Grover Denis MD  Active   sacubitriL-valsartan (Entresto) 24-26 mg tablet [id number] Yes Take 1 tablet by mouth 2 times a day. Grover Denis MD  Active   semaglutide (Ozempic) 0.25 mg or 0.5 mg (2 mg/3 mL) pen injector [id number]  Inject 0.25 mg under the skin 1 (one) time per week.   Patient not taking: Reported on 6/16/2025    Grover Denis MD  Active   spironolactone (Aldactone) 25 mg tablet [id number] Yes Take 1 tablet (25 mg) by mouth once daily. Grover Denis MD  Active   triamcinolone (Kenalog) 0.1 % cream [id number] Yes APPLY TWICE DAILY FOR 3 WEEKS AS NEEDED TO RASH AREAS   Patient taking differently: Apply 1 Application topically 2 times a day as needed for irritation or rash.    Historical Provider, MD  Active                    DISEASE MANAGEMENT ASSESSMENT:     DIABETES ASSESSMENT      PMH REVIEW:  - PMH of Pancreatitis: No  - PMH of Retinopathy: No  - PMH of Urinary Tract Infections: No  - PMH of MTC: No     HEALTH MAINTENANCE:   Foot Exam: has not gotten one will check with PCP  Eye Exam: Yes yearly   Lipid Panel:  (4/3/25)  Urine Albumin: 27 (4/3/25)     PRIMARY/SECONDARY PREVENTION:   - Statin? Yes - rosuvastatin 40mg every day   - ACE-I/ARB? Yes - Entresto 24/26mg BID   - Aspirin? Yes    Last Recorded Vitals:  BP Readings from Last 6 Encounters:   06/04/25 88/52   04/30/25 126/67   04/03/25 129/83       Wt Readings from Last 6 Encounters:   06/04/25 90.6 kg (199 lb 12.8 oz)   04/30/25 92.7 kg (204 lb 4.8 oz)   04/03/25 91.9 kg (202 lb 9 oz)       CURRENT PHARMACOTHERAPY:    Jardiance 25mg every day   Ozempic 0.25mg q7d     Affordability/Accessibility: $0/month   Adherence/Organization: reports adherence   Adverse Effects:   None reported     The patient does not have a known family history of diabetes.    Current diet: limiting carbohydrates, diet pop, peanut butter for snacks   Current exercise: no regular  exercise    Current monitoring regimen:   Patient is using: continuous glucose monitor    Date Pre-Breakfast Pre-Lunch Pre-Dinner Pre-Bedtime   7/21/25 125                    Average         Any episodes of hypoglycemia? no    Lab Results   Component Value Date    HGBA1C 10.9 (H) 04/03/2025       Patients diabetes is poorly controlled with most recent A1c of 10.9% (Goal < 8%).       DISCUSSION/NOTES:   Since last visit patient has started Ozempic and Jardiance 25mg. He is tolerating both medications with no complaints.   Pt's BG was 125 this AM with no reports of hypoglycemia.   Will increase ozempic to 0.5mg starting next week.   Will follow up in 1 month for possible dose adjustment.     ASSESSMENT:    Assessment/Plan   Problem List Items Addressed This Visit       Diabetes mellitus (Multi) - Primary    A1C 10.9 (4/3/25) goal A1C < 8.   CONTINUE Jardiance 25mg every day and INCREASE ozempic from 0.25mg to 0.5mg q7d  Monitor A1c, BGs, and GI side effects          Relevant Medications    semaglutide (Ozempic) 0.25 mg or 0.5 mg (2 mg/3 mL) pen injector    Other Relevant Orders    Referral to Clinical Pharmacy         RECOMMENDATIONS/PLAN:    INCREASE  Ozempic to 0.5mg subcutaneous q7days  CONTINUE  Jardiance 25mg every day   Follow up in 1 month     Last Appnt with Referring Provider: 6/4/25  Next Appnt with Referring Provider: 8/6/25  Clinical Pharmacist follow up: 8/18/25  Type of Encounter: Mary HayD    Verbal consent to manage patient's drug therapy was obtained from the patient . They were informed they may decline to participate or withdraw from participation in pharmacy services at any time.    Continue all meds under the continuation of care with the referring provider and clinical pharmacy team.           [1]   Allergies  Allergen Reactions    Iodinated Contrast Media Other   [2]   Past Medical History:  Diagnosis Date    Abnormal electrocardiogram (ECG) (EKG) 10/23/2013     Abnormal electrocardiogram   [3]   Current Outpatient Medications on File Prior to Visit   Medication Sig Dispense Refill    acetaminophen (TylenoL) 325 mg tablet Take 2 tablets (650 mg) by mouth 4 times a day as needed.      aspirin 81 mg chewable tablet Chew 1 tablet (81 mg) once daily.      carvedilol (Coreg) 3.125 mg tablet Take 1 tablet (3.125 mg) by mouth 2 times daily (morning and late afternoon). 60 tablet 11    coenzyme Q10-vitamin E 100-5 mg-unit capsule Take 1 capsule by mouth once daily.      dorzolamide-timoloL (Cosopt) 22.3-6.8 mg/mL ophthalmic solution Administer 1 drop into the left eye 2 times a day.      empagliflozin (Jardiance) 25 mg tablet Take 1 tablet (25 mg) by mouth once daily. 30 tablet 11    FreeStyle Aristeo 14 Day Sensor kit CHANGE EVERY 14 DAYS      levothyroxine (Synthroid, Levoxyl) 88 mcg tablet Take 1 tablet (88 mcg) by mouth early in the morning..      Lumigan 0.01 % ophthalmic solution Administer 1 drop into both eyes once daily at bedtime.      multivit-minerals/folic acid (CENTRUM ADULT 50 PLUS ORAL) Take 1 tablet by mouth once daily.      nitroglycerin (Nitrostat) 0.4 mg SL tablet Place 1 tablet (0.4 mg) under the tongue every 5 minutes if needed for chest pain. 30 tablet 1    OneTouch Ultra Test TEST UTD ONCE D      rosuvastatin (Crestor) 40 mg tablet Take 1 tablet (40 mg) by mouth once daily. 30 tablet 11    sacubitriL-valsartan (Entresto) 24-26 mg tablet Take 1 tablet by mouth 2 times a day. 60 tablet 3    spironolactone (Aldactone) 25 mg tablet Take 1 tablet (25 mg) by mouth once daily. 30 tablet 11    triamcinolone (Kenalog) 0.1 % cream APPLY TWICE DAILY FOR 3 WEEKS AS NEEDED TO RASH AREAS (Patient taking differently: Apply 1 Application topically 2 times a day as needed for irritation or rash.)      [DISCONTINUED] semaglutide (Ozempic) 0.25 mg or 0.5 mg (2 mg/3 mL) pen injector Inject 0.25 mg under the skin 1 (one) time per week. (Patient not taking: Reported on 6/16/2025) 3  mL 3     No current facility-administered medications on file prior to visit.

## 2025-07-18 LAB
ALBUMIN/CREAT UR: 17 MG/G CREAT
CREAT UR-MCNC: 54 MG/DL (ref 20–320)
MICROALBUMIN UR-MCNC: 0.9 MG/DL

## 2025-07-21 ENCOUNTER — APPOINTMENT (OUTPATIENT)
Dept: PHARMACY | Facility: HOSPITAL | Age: 85
End: 2025-07-21
Payer: MEDICARE

## 2025-07-21 DIAGNOSIS — E11.65 TYPE 2 DIABETES MELLITUS WITH HYPERGLYCEMIA, WITHOUT LONG-TERM CURRENT USE OF INSULIN: Primary | ICD-10-CM

## 2025-07-21 RX ORDER — SEMAGLUTIDE 0.68 MG/ML
0.5 INJECTION, SOLUTION SUBCUTANEOUS WEEKLY
Qty: 3 ML | Refills: 1 | Status: SHIPPED | OUTPATIENT
Start: 2025-07-21

## 2025-07-21 NOTE — Clinical Note
Hello, · Since last visit patient has started Ozempic and Jardiance 25mg. He is tolerating both medications with no complaints.  · Pt's BG was 125 this AM with no reports of hypoglycemia.  · Will increase ozempic to 0.5mg starting next week.  · Will follow up in 1 month for possible dose adjustment.

## 2025-07-21 NOTE — ASSESSMENT & PLAN NOTE
A1C 10.9 (4/3/25) goal A1C < 8.   CONTINUE Jardiance 25mg every day and INCREASE ozempic from 0.25mg to 0.5mg q7d  Monitor A1c, BGs, and GI side effects

## 2025-07-22 PROCEDURE — RXMED WILLOW AMBULATORY MEDICATION CHARGE

## 2025-07-23 ENCOUNTER — PHARMACY VISIT (OUTPATIENT)
Dept: PHARMACY | Facility: CLINIC | Age: 85
End: 2025-07-23
Payer: COMMERCIAL

## 2025-07-24 LAB
BNP SERPL-MCNC: 151 PG/ML
CHOLEST SERPL-MCNC: 102 MG/DL
CHOLEST/HDLC SERPL: 2.6 (CALC)
HDLC SERPL-MCNC: 39 MG/DL
LDLC SERPL CALC-MCNC: 40 MG/DL (CALC)
NONHDLC SERPL-MCNC: 63 MG/DL (CALC)
TRIGL SERPL-MCNC: 142 MG/DL

## 2025-08-06 ENCOUNTER — OFFICE VISIT (OUTPATIENT)
Dept: CARDIOLOGY | Facility: CLINIC | Age: 85
End: 2025-08-06
Payer: MEDICARE

## 2025-08-06 VITALS
OXYGEN SATURATION: 96 % | HEIGHT: 73 IN | WEIGHT: 193.56 LBS | BODY MASS INDEX: 25.65 KG/M2 | HEART RATE: 73 BPM | SYSTOLIC BLOOD PRESSURE: 94 MMHG | DIASTOLIC BLOOD PRESSURE: 54 MMHG

## 2025-08-06 DIAGNOSIS — I44.7 LBBB (LEFT BUNDLE BRANCH BLOCK): ICD-10-CM

## 2025-08-06 DIAGNOSIS — E11.9 DIABETES MELLITUS TYPE II, NON INSULIN DEPENDENT (MULTI): ICD-10-CM

## 2025-08-06 DIAGNOSIS — I10 BENIGN ESSENTIAL HYPERTENSION: ICD-10-CM

## 2025-08-06 DIAGNOSIS — I50.22 CHRONIC HEART FAILURE WITH REDUCED EJECTION FRACTION (HFREF, <= 40%): Primary | ICD-10-CM

## 2025-08-06 PROCEDURE — 1126F AMNT PAIN NOTED NONE PRSNT: CPT | Performed by: INTERNAL MEDICINE

## 2025-08-06 PROCEDURE — 3074F SYST BP LT 130 MM HG: CPT | Performed by: INTERNAL MEDICINE

## 2025-08-06 PROCEDURE — 99212 OFFICE O/P EST SF 10 MIN: CPT

## 2025-08-06 PROCEDURE — 1159F MED LIST DOCD IN RCRD: CPT | Performed by: INTERNAL MEDICINE

## 2025-08-06 PROCEDURE — 3078F DIAST BP <80 MM HG: CPT | Performed by: INTERNAL MEDICINE

## 2025-08-06 PROCEDURE — G2211 COMPLEX E/M VISIT ADD ON: HCPCS | Performed by: INTERNAL MEDICINE

## 2025-08-06 PROCEDURE — 99214 OFFICE O/P EST MOD 30 MIN: CPT | Performed by: INTERNAL MEDICINE

## 2025-08-06 RX ORDER — METOPROLOL SUCCINATE 50 MG/1
75 TABLET, EXTENDED RELEASE ORAL
Status: ON HOLD | COMMUNITY

## 2025-08-06 RX ORDER — WARFARIN 4 MG/1
TABLET ORAL
COMMUNITY
End: 2025-08-06 | Stop reason: ALTCHOICE

## 2025-08-06 RX ORDER — METOPROLOL TARTRATE 25 MG/1
1 TABLET, FILM COATED ORAL
Status: ON HOLD | COMMUNITY

## 2025-08-06 ASSESSMENT — ENCOUNTER SYMPTOMS
DEPRESSION: 0
OCCASIONAL FEELINGS OF UNSTEADINESS: 0
LOSS OF SENSATION IN FEET: 0

## 2025-08-06 ASSESSMENT — PAIN SCALES - GENERAL: PAINLEVEL_OUTOF10: 0-NO PAIN

## 2025-08-06 NOTE — PROGRESS NOTES
Primary Care Physician: Alex Borges MD  Date of Visit: 08/06/2025  2:00 PM EDT  [place] [facility] [place] [address]   Last office visit: 6/4/2025    Chief Complaint:     Follow-up HFrEF    HPI/Summary  Jr Grant is a 84 y.o. male who presents for followup cardiology evaluation.     The patient recently reestablished cardiology care.  We had seen him in January, 2019 and at that time we recognized hypertension, hyperlipidemia, and a prior history of atrial fibrillation status post catheter ablation in 2009.  In 2018, he presented with exertional chest discomfort.  Catheterization on December 21, 2018 showed no aortic stenosis, and elevated LVEDP, and a 10 to 30% proximal LAD.  Diabetes was diagnosed a few years ago, and his primary care provider initiated treatment with Jardiance.  We obtained an echocardiogram on April 22, 2025 that showed new severe LV systolic dysfunction with an ejection fraction of 30%, global hypokinesis, mild LV dilatation, normal RV function and mild to moderate MR.    He continues on low-dose aspirin, carvedilol 3.125 mg twice daily, Jardiance titrated to 25 mg daily, rosuvastatin 40 mg daily, Entresto 24-26 mg twice daily, and Ozempic titrated to 0.5 mg weekly.  Also, spironolactone 25 mg daily.    Recent laboratory testing reviewed shows a cholesterol of 102, HDL 39, LDL 40, triglycerides 142.  .  Creatinine remains normal at 0.77.    Patient is feeling quite well.  No symptoms of heart failure.  Tolerating Ozempic without GI side effects.  He has lost about 7 pounds since the last visit.            Specialty Problems          Cardiology Problems    Atrial fibrillation (Multi)    HTN (hypertension)    Benign essential hypertension    Coronary artery disease, non-occlusive    Mid sternal chest pain    Mixed hyperlipidemia    Chronic diastolic heart failure    LBBB (left bundle branch block)      Social History[1]   RX Allergies[2]  Current Outpatient Medications  "  Medication Instructions    acetaminophen (TylenoL) 325 mg tablet 2 tablets, 4 times daily PRN    aspirin 81 mg chewable tablet 1 tablet, Daily    carvedilol (COREG) 3.125 mg, oral, 2 times daily (morning and late afternoon)    coenzyme Q10-vitamin E 100-5 mg-unit capsule 1 capsule, Daily    dorzolamide-timoloL (Cosopt) 22.3-6.8 mg/mL ophthalmic solution 1 drop, 2 times daily    empagliflozin (JARDIANCE) 25 mg, oral, Daily    FreeStyle Aristeo 14 Day Sensor kit CHANGE EVERY 14 DAYS    levothyroxine (Synthroid, Levoxyl) 88 mcg tablet 1 tablet, Daily (0630)    Lumigan 0.01 % ophthalmic solution 1 drop, Nightly    metoprolol succinate XL (TOPROL XL) 75 mg, Daily RT    metoprolol tartrate (Lopressor) 25 mg tablet 1 tablet    multivit-minerals/folic acid (CENTRUM ADULT 50 PLUS ORAL) 1 tablet, Daily    nitroglycerin (NITROSTAT) 0.4 mg, sublingual, Every 5 min PRN    OneTouch Ultra Test TEST UTD ONCE D    Ozempic 0.5 mg, subcutaneous, Weekly    rosuvastatin (CRESTOR) 40 mg, oral, Daily    sacubitriL-valsartan (Entresto) 24-26 mg tablet 1 tablet, oral, 2 times daily    spironolactone (ALDACTONE) 25 mg, oral, Daily    triamcinolone (Kenalog) 0.1 % cream APPLY TWICE DAILY FOR 3 WEEKS AS NEEDED TO RASH AREAS       ROS    Vital Signs:  Vitals:    08/06/25 1412   BP: 94/54   BP Location: Left arm   Patient Position: Sitting   Pulse: 73   SpO2: 96%   Weight: 87.8 kg (193 lb 9 oz)   Height: 1.854 m (6' 1\")     Wt Readings from Last 2 Encounters:   08/06/25 87.8 kg (193 lb 9 oz)   06/04/25 90.6 kg (199 lb 12.8 oz)     Body mass index is 25.54 kg/m².     Physical Exam:    Pleasant and cooperative.  Lung fields clear.  Heart sounds regular, no murmurs appreciated.  Abdomen nontender to palpation.  Lower extremity edema has resolved.    Lab Review:  CBC:  Lab Results   Component Value Date    WBC 8.0 04/03/2025    HGB 15.4 04/03/2025    HCT 45.7 04/03/2025    MCV 94.4 04/03/2025     04/03/2025       CMP:  Recent Labs     " 05/15/25  1043 04/03/25  0928   GLUCOSE 309* 340*    137   K 4.5 3.9    100   CO2 25 26   ANIONGAP  --  11   BUN 21 20   CREATININE 0.77 0.71   EGFR 87 89   ALBUMIN 4.3 4.2   ALKPHOS  --  93   PROT  --  7.1   ALT  --  17   AST  --  17   BILITOT  --  0.6         LIPID PANEL:  Lab Results   Component Value Date    CHOL 102 07/23/2025    HDL 39 (L) 07/23/2025    CHHDL 2.6 07/23/2025    TRIG 142 07/23/2025    NHDL 63 07/23/2025       HEME/ENDO:  Lab Results   Component Value Date    HGBA1C 10.9 (H) 04/03/2025    TSH 1.34 04/03/2025         Recent Labs     07/23/25  0839 05/15/25  1043 04/03/25  0928   * 118* 186*     Recent Cardiology Tests:    ECG:    Noted below, not performed today.    Results for orders placed in visit on 04/03/25    ECG 12 lead (Clinic Performed)    Narrative  Normal sinus rhythm  Left axis deviation  Left bundle branch block  Abnormal ECG  When compared with ECG of 05-DEC-2018 14:21,  Left bundle branch block is now Present  Confirmed by Grover Reeves ([id number]) on 4/4/2025 11:54:55 AM       Echo:  Echo Results:  Transthoracic Echo (TTE) Complete With Contrast 04/22/2025    Narrative  [facility], [address] [place] [address]  Tel [phone] and Fax [phone]    TRANSTHORACIC ECHOCARDIOGRAM REPORT      Patient Name:       LISSETH Starks Physician:    [id number] Glen Murguia MD  Study Date:         4/22/2025           Ordering Provider:    [id number] GROVER REEVES  MRN/PID:            [id number]            Fellow:  Accession#:         [id number]        Nurse:                Gwendolyn Cantu RN  Date of Birth/Age:  1940 / 84     Sonographer:          Christina fung RDCS  Gender assigned at  M                   Additional Staff:  Birth:  Height:             180.34 cm           Admit Date:  Weight:             91.63 kg            Admission Status:     Outpatient  BSA / BMI:          2.12 m2 / 28.17      Encounter#:           [id number]  kg/m2  Blood Pressure:     137/71 mmHg         Department Location:  [facility]    Study Type:    TRANSTHORACIC ECHO (TTE) COMPLETE  Diagnosis/ICD: Atherosclerotic heart disease of native coronary artery without  angina pectoris-I25.10  Indication:    LBBB; CAD; DM; Dyspnea  CPT Code:      Echo Complete w Full Doppler-[id number]    Patient History:  Diabetes:          Yes  Pertinent History: HTN, Hyperlipidemia, Dyspnea, A-Fib and CAD. S/p catheter  ablation (2009).    Study Detail: The following Echo studies were performed: 2D, M-Mode, Doppler and  color flow. Technically challenging study due to body habitus and  prominent lung artifact. Definity used as a contrast agent for  endocardial border definition. Total contrast used for this  procedure was 2.5 mL via IV push.      PHYSICIAN INTERPRETATION:  Left Ventricle: Left ventricular ejection fraction is moderately decreased, by visual estimate at 30%. There is global hypokinesis of the left ventricle with minor regional variations. The left ventricular cavity size is mild to moderately dilated. There is normal septal and normal posterior left ventricular wall thickness. Abnormal (paradoxical) septal motion, consistent with left bundle branch block. Spectral Doppler shows a Grade I (impaired relaxation pattern) of left ventricular diastolic filling with normal left atrial filling pressure.  Left Atrium: The left atrial size is normal.  Right Ventricle: The right ventricle is normal in size. There is normal right ventricular global systolic function.  Right Atrium: The right atrium is normal in size.  Aortic Valve: The aortic valve is probably trileaflet. There is mild aortic valve cusp calcification. The aortic valve dimensionless index is 0.99. There is mild aortic valve regurgitation. The peak instantaneous gradient of the aortic valve is 7 mmHg. The mean gradient of the aortic valve is 2 mmHg.  Mitral Valve: The mitral valve  is normal in structure. There is mild mitral annular calcification. There is mild to moderate mitral valve regurgitation.  Tricuspid Valve: The tricuspid valve is structurally normal. There is trace tricuspid regurgitation.  Pulmonic Valve: The pulmonic valve is structurally normal. There is trace pulmonic valve regurgitation.  Pericardium: Trivial pericardial effusion.  Aorta: The aortic root is abnormal. The Ao Sinus is 4.40 cm. The Asc Ao is 3.70 cm. There is mild dilatation of the ascending aorta. There is moderate dilatation the aortic root.  Systemic Veins: The inferior vena cava appears normal in size (not well visualized).  In comparison to the previous echocardiogram(s): Compared with study dated 4/11/2016, EF is now reduced. Aortic root size unchanged.      CONCLUSIONS:  1. Left ventricular cavity size is mild to moderately dilated.  2. Left ventricular ejection fraction is moderately decreased, by visual estimate at 30%.  3. There is global hypokinesis of the left ventricle with minor regional variations.  4. Abnormal septal motion consistent with left bundle branch block.  5. Spectral Doppler shows a Grade I (impaired relaxation pattern) of left ventricular diastolic filling with normal left atrial filling pressure.  6. There is normal right ventricular global systolic function.  7. There is moderate dilatation of the aortic root.  8. Mild aortic valve regurgitation.  9. Mild to moderate mitral valve regurgitation.    QUANTITATIVE DATA SUMMARY:    2D MEASUREMENTS:          Normal Ranges:  IVSd:            0.96 cm  (0.6-1.1cm)  LVPWd:           0.86 cm  (0.6-1.1cm)  LVIDd:           5.73 cm  (3.9-5.9cm)  LVIDs:           4.03 cm  LV Mass Index:   109 g/m2  LVEDV Index:     95 ml/m2  LV % FS          29.7 %      LEFT ATRIUM:                  Normal Ranges:  LA Vol A4C:        24.5 ml    (22+/-6mL/m2)  LA Vol A2C:        77.6 ml  LA Vol BP:         47.8 ml  LA Vol Index A4C:  11.6 ml/m2  LA Vol Index A2C:   36.6 ml/m2  LA Vol Index BP:   22.6 ml/m2  LA Area A4C:       11.4 cm2  LA Area A2C:       22.2 cm2  LA Major Axis A4C: 4.5 cm  LA Major Axis A2C: 5.4 cm  LA Vol A4C:        23.3 ml  LA Vol A2C:        74.2 ml  LA Vol Index BSA:  23.0 ml/m2      RIGHT ATRIUM:                 Normal Ranges:  RA Vol A4C:        34.9 ml    (8.3-19.5ml)  RA Vol Index A4C:  16.5 ml/m2  RA Area A4C:       13.9 cm2  RA Major Axis A4C: 4.7 cm      M-MODE MEASUREMENTS:         Normal Ranges:  Ao Root:             4.40 cm (2.0-3.7cm)      AORTA MEASUREMENTS:         Normal Ranges:  Ao Sinus, d:        4.40 cm (2.1-3.5cm)  Asc Ao, d:          3.70 cm (2.1-3.4cm)      LV SYSTOLIC FUNCTION:  Normal Ranges:  EF-A4C View:    32 % (>=55%)  EF-A2C View:    28 %  EF-Biplane:     29 %  EF-Visual:      30 %  LV EF Reported: 30 %      LV DIASTOLIC FUNCTION:             Normal Ranges:  MV Peak E:             0.62 m/s    (0.7-1.2 m/s)  MV Peak A:             0.87 m/s    (0.42-0.7 m/s)  E/A Ratio:             0.72        (1.0-2.2)  MV e'                  0.059 m/s   (>8.0)  MV lateral e'          0.08 m/s  MV medial e'           0.04 m/s  MV A Dur:              167.46 msec  E/e' Ratio:            10.43       (<8.0)      MITRAL VALVE:          Normal Ranges:  MV DT:        169 msec (150-240msec)      MITRAL INSUFFICIENCY:             Normal Ranges:  MR VTI:               165.73 cm  MR Vmax:              479.34 cm/s      AORTIC VALVE:                     Normal Ranges:  AoV Vmax:                1.32 m/s (<=1.7m/s)  AoV Peak P.0 mmHg (<20mmHg)  AoV Mean P.5 mmHg (1.7-11.5mmHg)  LVOT Max Lazaro:            0.83 m/s (<=1.1m/s)  AoV VTI:                 17.14 cm (18-25cm)  LVOT VTI:                17.02 cm  LVOT Diameter:           2.25 cm  (1.8-2.4cm)  AoV Area, VTI:           3.82 cm2 (2.5-5.5cm2)  AoV Area,Vmax:           2.42 cm2 (2.5-4.5cm2)  AoV Dimensionless Index: 0.99      AORTIC INSUFFICIENCY:  AI Vmax:       2.13 m/s  AI  Half-time:  620 msec  AI Decel Time: 2137 msec  AI Decel Rate: 99.78 cm/s2      RIGHT VENTRICLE:  RV Basal 4.50 cm  RV Mid   3.10 cm  RV Major 7.4 cm  TAPSE:   22.4 mm  RV s'    0.10 m/s      TRICUSPID VALVE/RVSP:         Normal Ranges:  IVC Diam:             1.60 cm      PULMONIC VALVE:          Normal Ranges:  PV Accel Time:  69 msec  (>120ms)  PV Max Lazaro:     0.6 m/s  (0.6-0.9m/s)  PV Max P.7 mmHg      [id number] Glen Murguia MD  Electronically signed on 2025 at 10:23:51 AM        ** Final **       Cath:      Stress Test:  Stress Results:  No results found for this or any previous visit from the past 365 days.         Cardiac Imaging:        Assessment/Plan   The patient has tolerated initial GDMT with high-dose statin therapy, Entresto, spironolactone, carvedilol and Jardiance.  Today, the office blood pressure was still quite low and we did not recommend further titration of his medications as yet.  He has done well with Ozempic, now followed monthly by clinical pharmacy.  BNP is nearly normal.  We anticipate some improvement in hemoglobin A1c.    PLAN: Continue titration of Ozempic under the direction of clinical pharmacy.  Check hemoglobin A1c today, 4 months after the last study.  Limited echocardiogram to be scheduled this month.  If the LVEF remains severely impaired, we will consider relook coronary angiography.  Follow-up office visit 3 months.    Orders:  Orders Placed This Encounter   Procedures    Hemoglobin A1C    Transthoracic Echo Limited      Followup Appts:  Future Appointments   Date Time Provider Department Center   2025  2:20 PM Belen Wilder PharmD [id number] [facility]   2025  2:00 PM TWINS ECHO/STRESS [id number] [facility] [address]   2025  2:40 PM Grover Denis MD [id number] [place]           ____________________________________________________________  Grover Denis MD    Senior Attending Physician  [facility]  [facility]  [facility]    [facility]  [employer]         [1]   Social History  Tobacco Use    Smoking status: Former     Types: Cigarettes    Smokeless tobacco: Never   Substance Use Topics    Alcohol use: Not Currently    Drug use: Never   [2]   Allergies  Allergen Reactions    Iodinated Contrast Media Other

## 2025-08-07 LAB
EST. AVERAGE GLUCOSE BLD GHB EST-MCNC: 243 MG/DL
EST. AVERAGE GLUCOSE BLD GHB EST-SCNC: 13.5 MMOL/L
HBA1C MFR BLD: 10.1 %

## 2025-08-10 ENCOUNTER — APPOINTMENT (OUTPATIENT)
Dept: CARDIOLOGY | Facility: HOSPITAL | Age: 85
End: 2025-08-10
Payer: MEDICARE

## 2025-08-10 ENCOUNTER — APPOINTMENT (OUTPATIENT)
Dept: RADIOLOGY | Facility: HOSPITAL | Age: 85
End: 2025-08-10
Payer: MEDICARE

## 2025-08-10 ENCOUNTER — HOSPITAL ENCOUNTER (OUTPATIENT)
Facility: HOSPITAL | Age: 85
Setting detail: OBSERVATION
Discharge: HOME | End: 2025-08-11
Attending: STUDENT IN AN ORGANIZED HEALTH CARE EDUCATION/TRAINING PROGRAM | Admitting: INTERNAL MEDICINE
Payer: MEDICARE

## 2025-08-10 DIAGNOSIS — I63.412 ACUTE CEREBROVASCULAR ACCIDENT (CVA) DUE TO EMBOLISM OF LEFT MIDDLE CEREBRAL ARTERY (MULTI): ICD-10-CM

## 2025-08-10 DIAGNOSIS — R29.90 STROKE-LIKE SYMPTOMS: Primary | ICD-10-CM

## 2025-08-10 DIAGNOSIS — G45.9 TIA (TRANSIENT ISCHEMIC ATTACK): ICD-10-CM

## 2025-08-10 DIAGNOSIS — I48.91 ATRIAL FIBRILLATION, UNSPECIFIED TYPE (MULTI): ICD-10-CM

## 2025-08-10 LAB
ALBUMIN SERPL BCP-MCNC: 4 G/DL (ref 3.4–5)
ALP SERPL-CCNC: 56 U/L (ref 33–136)
ALT SERPL W P-5'-P-CCNC: 37 U/L (ref 10–52)
ANION GAP SERPL CALC-SCNC: 13 MMOL/L (ref 10–20)
APPEARANCE UR: CLEAR
AST SERPL W P-5'-P-CCNC: 28 U/L (ref 9–39)
BASOPHILS # BLD AUTO: 0.05 X10*3/UL (ref 0–0.1)
BASOPHILS NFR BLD AUTO: 0.7 %
BILIRUB SERPL-MCNC: 0.8 MG/DL (ref 0–1.2)
BILIRUB UR STRIP.AUTO-MCNC: NEGATIVE MG/DL
BNP SERPL-MCNC: 171 PG/ML (ref 0–99)
BUN SERPL-MCNC: 16 MG/DL (ref 6–23)
CALCIUM SERPL-MCNC: 8.8 MG/DL (ref 8.6–10.3)
CARDIAC TROPONIN I PNL SERPL HS: 5 NG/L (ref 0–20)
CHLORIDE SERPL-SCNC: 102 MMOL/L (ref 98–107)
CO2 SERPL-SCNC: 25 MMOL/L (ref 21–32)
COLOR UR: ABNORMAL
CREAT SERPL-MCNC: 0.75 MG/DL (ref 0.5–1.3)
EGFRCR SERPLBLD CKD-EPI 2021: 88 ML/MIN/1.73M*2
EOSINOPHIL # BLD AUTO: 0.11 X10*3/UL (ref 0–0.4)
EOSINOPHIL NFR BLD AUTO: 1.5 %
ERYTHROCYTE [DISTWIDTH] IN BLOOD BY AUTOMATED COUNT: 13.8 % (ref 11.5–14.5)
GLUCOSE BLD MANUAL STRIP-MCNC: 129 MG/DL (ref 74–99)
GLUCOSE BLD MANUAL STRIP-MCNC: 149 MG/DL (ref 74–99)
GLUCOSE SERPL-MCNC: 132 MG/DL (ref 74–99)
GLUCOSE UR STRIP.AUTO-MCNC: ABNORMAL MG/DL
HCT VFR BLD AUTO: 42 % (ref 41–52)
HGB BLD-MCNC: 14.2 G/DL (ref 13.5–17.5)
IMM GRANULOCYTES # BLD AUTO: 0.02 X10*3/UL (ref 0–0.5)
IMM GRANULOCYTES NFR BLD AUTO: 0.3 % (ref 0–0.9)
INR PPP: 1.1 (ref 0.9–1.1)
KETONES UR STRIP.AUTO-MCNC: ABNORMAL MG/DL
LEUKOCYTE ESTERASE UR QL STRIP.AUTO: NEGATIVE
LYMPHOCYTES # BLD AUTO: 1.96 X10*3/UL (ref 0.8–3)
LYMPHOCYTES NFR BLD AUTO: 26.1 %
MAGNESIUM SERPL-MCNC: 2.2 MG/DL (ref 1.6–2.4)
MCH RBC QN AUTO: 31.3 PG (ref 26–34)
MCHC RBC AUTO-ENTMCNC: 33.8 G/DL (ref 32–36)
MCV RBC AUTO: 93 FL (ref 80–100)
MONOCYTES # BLD AUTO: 0.57 X10*3/UL (ref 0.05–0.8)
MONOCYTES NFR BLD AUTO: 7.6 %
NEUTROPHILS # BLD AUTO: 4.8 X10*3/UL (ref 1.6–5.5)
NEUTROPHILS NFR BLD AUTO: 63.8 %
NITRITE UR QL STRIP.AUTO: NEGATIVE
NRBC BLD-RTO: 0 /100 WBCS (ref 0–0)
PH UR STRIP.AUTO: 7.5 [PH]
PLATELET # BLD AUTO: 167 X10*3/UL (ref 150–450)
POTASSIUM SERPL-SCNC: 3.8 MMOL/L (ref 3.5–5.3)
PROT SERPL-MCNC: 6.6 G/DL (ref 6.4–8.2)
PROT UR STRIP.AUTO-MCNC: NEGATIVE MG/DL
PROTHROMBIN TIME: 12 SECONDS (ref 9.8–12.4)
RBC # BLD AUTO: 4.53 X10*6/UL (ref 4.5–5.9)
RBC # UR STRIP.AUTO: NEGATIVE MG/DL
SODIUM SERPL-SCNC: 136 MMOL/L (ref 136–145)
SP GR UR STRIP.AUTO: 1.02
UROBILINOGEN UR STRIP.AUTO-MCNC: NORMAL MG/DL
WBC # BLD AUTO: 7.5 X10*3/UL (ref 4.4–11.3)

## 2025-08-10 PROCEDURE — 81003 URINALYSIS AUTO W/O SCOPE: CPT | Performed by: STUDENT IN AN ORGANIZED HEALTH CARE EDUCATION/TRAINING PROGRAM

## 2025-08-10 PROCEDURE — 83880 ASSAY OF NATRIURETIC PEPTIDE: CPT | Performed by: STUDENT IN AN ORGANIZED HEALTH CARE EDUCATION/TRAINING PROGRAM

## 2025-08-10 PROCEDURE — 85610 PROTHROMBIN TIME: CPT | Performed by: STUDENT IN AN ORGANIZED HEALTH CARE EDUCATION/TRAINING PROGRAM

## 2025-08-10 PROCEDURE — G0378 HOSPITAL OBSERVATION PER HR: HCPCS

## 2025-08-10 PROCEDURE — 71045 X-RAY EXAM CHEST 1 VIEW: CPT

## 2025-08-10 PROCEDURE — 2500000001 HC RX 250 WO HCPCS SELF ADMINISTERED DRUGS (ALT 637 FOR MEDICARE OP): Performed by: STUDENT IN AN ORGANIZED HEALTH CARE EDUCATION/TRAINING PROGRAM

## 2025-08-10 PROCEDURE — 70450 CT HEAD/BRAIN W/O DYE: CPT

## 2025-08-10 PROCEDURE — 82947 ASSAY GLUCOSE BLOOD QUANT: CPT

## 2025-08-10 PROCEDURE — 85025 COMPLETE CBC W/AUTO DIFF WBC: CPT | Performed by: STUDENT IN AN ORGANIZED HEALTH CARE EDUCATION/TRAINING PROGRAM

## 2025-08-10 PROCEDURE — 93005 ELECTROCARDIOGRAM TRACING: CPT

## 2025-08-10 PROCEDURE — 84484 ASSAY OF TROPONIN QUANT: CPT | Performed by: STUDENT IN AN ORGANIZED HEALTH CARE EDUCATION/TRAINING PROGRAM

## 2025-08-10 PROCEDURE — 83735 ASSAY OF MAGNESIUM: CPT | Performed by: STUDENT IN AN ORGANIZED HEALTH CARE EDUCATION/TRAINING PROGRAM

## 2025-08-10 PROCEDURE — 80053 COMPREHEN METABOLIC PANEL: CPT | Performed by: STUDENT IN AN ORGANIZED HEALTH CARE EDUCATION/TRAINING PROGRAM

## 2025-08-10 PROCEDURE — 70450 CT HEAD/BRAIN W/O DYE: CPT | Performed by: STUDENT IN AN ORGANIZED HEALTH CARE EDUCATION/TRAINING PROGRAM

## 2025-08-10 PROCEDURE — 99285 EMERGENCY DEPT VISIT HI MDM: CPT | Mod: 25 | Performed by: STUDENT IN AN ORGANIZED HEALTH CARE EDUCATION/TRAINING PROGRAM

## 2025-08-10 PROCEDURE — 36415 COLL VENOUS BLD VENIPUNCTURE: CPT | Performed by: STUDENT IN AN ORGANIZED HEALTH CARE EDUCATION/TRAINING PROGRAM

## 2025-08-10 PROCEDURE — 71045 X-RAY EXAM CHEST 1 VIEW: CPT | Mod: FOREIGN READ | Performed by: RADIOLOGY

## 2025-08-10 PROCEDURE — 99223 1ST HOSP IP/OBS HIGH 75: CPT | Performed by: INTERNAL MEDICINE

## 2025-08-10 RX ORDER — LATANOPROST 50 UG/ML
1 SOLUTION/ DROPS OPHTHALMIC NIGHTLY
Status: DISCONTINUED | OUTPATIENT
Start: 2025-08-10 | End: 2025-08-11 | Stop reason: HOSPADM

## 2025-08-10 RX ORDER — DOCUSATE SODIUM 100 MG/1
100 CAPSULE, LIQUID FILLED ORAL 2 TIMES DAILY
Status: DISCONTINUED | OUTPATIENT
Start: 2025-08-10 | End: 2025-08-11 | Stop reason: HOSPADM

## 2025-08-10 RX ORDER — ACETAMINOPHEN 325 MG/1
650 TABLET ORAL 4 TIMES DAILY PRN
Status: DISCONTINUED | OUTPATIENT
Start: 2025-08-10 | End: 2025-08-11 | Stop reason: HOSPADM

## 2025-08-10 RX ORDER — LABETALOL HYDROCHLORIDE 5 MG/ML
10 INJECTION, SOLUTION INTRAVENOUS EVERY 10 MIN PRN
Status: DISCONTINUED | OUTPATIENT
Start: 2025-08-10 | End: 2025-08-11 | Stop reason: HOSPADM

## 2025-08-10 RX ORDER — DORZOLAMIDE HYDROCHLORIDE AND TIMOLOL MALEATE 20; 5 MG/ML; MG/ML
1 SOLUTION/ DROPS OPHTHALMIC 2 TIMES DAILY
Status: DISCONTINUED | OUTPATIENT
Start: 2025-08-10 | End: 2025-08-11 | Stop reason: HOSPADM

## 2025-08-10 RX ORDER — CARVEDILOL 3.12 MG/1
3.12 TABLET ORAL
Status: DISCONTINUED | OUTPATIENT
Start: 2025-08-11 | End: 2025-08-11 | Stop reason: HOSPADM

## 2025-08-10 RX ORDER — SPIRONOLACTONE 25 MG/1
25 TABLET ORAL DAILY
Status: DISCONTINUED | OUTPATIENT
Start: 2025-08-11 | End: 2025-08-11 | Stop reason: HOSPADM

## 2025-08-10 RX ORDER — ASPIRIN 300 MG/1
300 SUPPOSITORY RECTAL DAILY
Status: DISCONTINUED | OUTPATIENT
Start: 2025-08-11 | End: 2025-08-11

## 2025-08-10 RX ORDER — NAPROXEN SODIUM 220 MG/1
81 TABLET, FILM COATED ORAL DAILY
Status: DISCONTINUED | OUTPATIENT
Start: 2025-08-11 | End: 2025-08-11

## 2025-08-10 RX ORDER — ASPIRIN 81 MG/1
81 TABLET ORAL DAILY
Status: DISCONTINUED | OUTPATIENT
Start: 2025-08-11 | End: 2025-08-11 | Stop reason: HOSPADM

## 2025-08-10 RX ORDER — ACETAMINOPHEN 325 MG/1
650 TABLET ORAL ONCE
Status: COMPLETED | OUTPATIENT
Start: 2025-08-10 | End: 2025-08-10

## 2025-08-10 RX ORDER — ENOXAPARIN SODIUM 100 MG/ML
40 INJECTION SUBCUTANEOUS EVERY 24 HOURS
Status: DISCONTINUED | OUTPATIENT
Start: 2025-08-10 | End: 2025-08-11 | Stop reason: HOSPADM

## 2025-08-10 RX ORDER — LEVOTHYROXINE SODIUM 88 UG/1
88 TABLET ORAL
Status: DISCONTINUED | OUTPATIENT
Start: 2025-08-11 | End: 2025-08-11 | Stop reason: HOSPADM

## 2025-08-10 RX ORDER — ROSUVASTATIN CALCIUM 20 MG/1
40 TABLET, COATED ORAL NIGHTLY
Status: DISCONTINUED | OUTPATIENT
Start: 2025-08-10 | End: 2025-08-11 | Stop reason: HOSPADM

## 2025-08-10 RX ORDER — NAPROXEN SODIUM 220 MG/1
81 TABLET, FILM COATED ORAL DAILY
Status: DISCONTINUED | OUTPATIENT
Start: 2025-08-11 | End: 2025-08-10 | Stop reason: SDUPTHER

## 2025-08-10 RX ORDER — NITROGLYCERIN 0.4 MG/1
0.4 TABLET SUBLINGUAL EVERY 5 MIN PRN
Status: DISCONTINUED | OUTPATIENT
Start: 2025-08-10 | End: 2025-08-11 | Stop reason: HOSPADM

## 2025-08-10 RX ORDER — ROSUVASTATIN CALCIUM 20 MG/1
40 TABLET, COATED ORAL DAILY
Status: DISCONTINUED | OUTPATIENT
Start: 2025-08-11 | End: 2025-08-10 | Stop reason: SDUPTHER

## 2025-08-10 RX ADMIN — ACETAMINOPHEN 650 MG: 325 TABLET ORAL at 20:52

## 2025-08-10 SDOH — SOCIAL STABILITY: SOCIAL INSECURITY: DOES ANYONE TRY TO KEEP YOU FROM HAVING/CONTACTING OTHER FRIENDS OR DOING THINGS OUTSIDE YOUR HOME?: NO

## 2025-08-10 SDOH — ECONOMIC STABILITY: FOOD INSECURITY: WITHIN THE PAST 12 MONTHS, THE FOOD YOU BOUGHT JUST DIDN'T LAST AND YOU DIDN'T HAVE MONEY TO GET MORE.: NEVER TRUE

## 2025-08-10 SDOH — SOCIAL STABILITY: SOCIAL INSECURITY: ARE YOU OR HAVE YOU BEEN THREATENED OR ABUSED PHYSICALLY, EMOTIONALLY, OR SEXUALLY BY ANYONE?: NO

## 2025-08-10 SDOH — ECONOMIC STABILITY: INCOME INSECURITY: IN THE PAST 12 MONTHS HAS THE ELECTRIC, GAS, OIL, OR WATER COMPANY THREATENED TO SHUT OFF SERVICES IN YOUR HOME?: NO

## 2025-08-10 SDOH — SOCIAL STABILITY: SOCIAL INSECURITY
WITHIN THE LAST YEAR, HAVE YOU BEEN RAPED OR FORCED TO HAVE ANY KIND OF SEXUAL ACTIVITY BY YOUR PARTNER OR EX-PARTNER?: NO

## 2025-08-10 SDOH — SOCIAL STABILITY: SOCIAL INSECURITY
WITHIN THE LAST YEAR, HAVE YOU BEEN KICKED, HIT, SLAPPED, OR OTHERWISE PHYSICALLY HURT BY YOUR PARTNER OR EX-PARTNER?: NO

## 2025-08-10 SDOH — SOCIAL STABILITY: SOCIAL INSECURITY: HAVE YOU HAD THOUGHTS OF HARMING ANYONE ELSE?: NO

## 2025-08-10 SDOH — SOCIAL STABILITY: SOCIAL INSECURITY: WITHIN THE LAST YEAR, HAVE YOU BEEN AFRAID OF YOUR PARTNER OR EX-PARTNER?: NO

## 2025-08-10 SDOH — SOCIAL STABILITY: SOCIAL INSECURITY: WITHIN THE LAST YEAR, HAVE YOU BEEN HUMILIATED OR EMOTIONALLY ABUSED IN OTHER WAYS BY YOUR PARTNER OR EX-PARTNER?: NO

## 2025-08-10 SDOH — ECONOMIC STABILITY: FOOD INSECURITY: WITHIN THE PAST 12 MONTHS, YOU WORRIED THAT YOUR FOOD WOULD RUN OUT BEFORE YOU GOT THE MONEY TO BUY MORE.: NEVER TRUE

## 2025-08-10 SDOH — SOCIAL STABILITY: SOCIAL INSECURITY: HAVE YOU HAD ANY THOUGHTS OF HARMING ANYONE ELSE?: NO

## 2025-08-10 SDOH — SOCIAL STABILITY: SOCIAL INSECURITY: DO YOU FEEL UNSAFE GOING BACK TO THE PLACE WHERE YOU ARE LIVING?: NO

## 2025-08-10 SDOH — SOCIAL STABILITY: SOCIAL INSECURITY: DO YOU FEEL ANYONE HAS EXPLOITED OR TAKEN ADVANTAGE OF YOU FINANCIALLY OR OF YOUR PERSONAL PROPERTY?: NO

## 2025-08-10 SDOH — SOCIAL STABILITY: SOCIAL INSECURITY: ABUSE: ADULT

## 2025-08-10 SDOH — SOCIAL STABILITY: SOCIAL INSECURITY: WERE YOU ABLE TO COMPLETE ALL THE BEHAVIORAL HEALTH SCREENINGS?: YES

## 2025-08-10 SDOH — SOCIAL STABILITY: SOCIAL INSECURITY: HAS ANYONE EVER THREATENED TO HURT YOUR FAMILY OR YOUR PETS?: NO

## 2025-08-10 SDOH — SOCIAL STABILITY: SOCIAL INSECURITY: ARE THERE ANY APPARENT SIGNS OF INJURIES/BEHAVIORS THAT COULD BE RELATED TO ABUSE/NEGLECT?: NO

## 2025-08-10 ASSESSMENT — COGNITIVE AND FUNCTIONAL STATUS - GENERAL
TOILETING: A LITTLE
PATIENT BASELINE BEDBOUND: NO
DRESSING REGULAR UPPER BODY CLOTHING: A LITTLE
HELP NEEDED FOR BATHING: A LITTLE
STANDING UP FROM CHAIR USING ARMS: A LITTLE
DAILY ACTIVITIY SCORE: 20
DRESSING REGULAR LOWER BODY CLOTHING: A LITTLE
WALKING IN HOSPITAL ROOM: A LITTLE
CLIMB 3 TO 5 STEPS WITH RAILING: A LITTLE
MOBILITY SCORE: 20
MOVING TO AND FROM BED TO CHAIR: A LITTLE

## 2025-08-10 ASSESSMENT — LIFESTYLE VARIABLES
AUDIT-C TOTAL SCORE: 0
TOTAL SCORE: 0
SKIP TO QUESTIONS 9-10: 1
AUDIT-C TOTAL SCORE: 0
HOW OFTEN DO YOU HAVE 6 OR MORE DRINKS ON ONE OCCASION: NEVER
EVER HAD A DRINK FIRST THING IN THE MORNING TO STEADY YOUR NERVES TO GET RID OF A HANGOVER: NO
HAVE YOU EVER FELT YOU SHOULD CUT DOWN ON YOUR DRINKING: NO
HAVE PEOPLE ANNOYED YOU BY CRITICIZING YOUR DRINKING: NO
HOW OFTEN DO YOU HAVE A DRINK CONTAINING ALCOHOL: NEVER
EVER FELT BAD OR GUILTY ABOUT YOUR DRINKING: NO
HOW MANY STANDARD DRINKS CONTAINING ALCOHOL DO YOU HAVE ON A TYPICAL DAY: PATIENT DOES NOT DRINK

## 2025-08-10 ASSESSMENT — ACTIVITIES OF DAILY LIVING (ADL)
WALKS IN HOME: INDEPENDENT
GROOMING: INDEPENDENT
LACK_OF_TRANSPORTATION: NO
BATHING: INDEPENDENT
PATIENT'S MEMORY ADEQUATE TO SAFELY COMPLETE DAILY ACTIVITIES?: YES
HEARING - RIGHT EAR: DIFFICULTY WITH NOISE
DRESSING YOURSELF: INDEPENDENT
JUDGMENT_ADEQUATE_SAFELY_COMPLETE_DAILY_ACTIVITIES: YES
TOILETING: INDEPENDENT
HEARING - LEFT EAR: DIFFICULTY WITH NOISE
ADEQUATE_TO_COMPLETE_ADL: YES
FEEDING YOURSELF: INDEPENDENT

## 2025-08-10 ASSESSMENT — PATIENT HEALTH QUESTIONNAIRE - PHQ9
1. LITTLE INTEREST OR PLEASURE IN DOING THINGS: NOT AT ALL
2. FEELING DOWN, DEPRESSED OR HOPELESS: NOT AT ALL
SUM OF ALL RESPONSES TO PHQ9 QUESTIONS 1 & 2: 0

## 2025-08-10 ASSESSMENT — PAIN DESCRIPTION - DESCRIPTORS: DESCRIPTORS: ACHING

## 2025-08-10 ASSESSMENT — PAIN - FUNCTIONAL ASSESSMENT
PAIN_FUNCTIONAL_ASSESSMENT: 0-10

## 2025-08-10 ASSESSMENT — PAIN SCALES - GENERAL
PAINLEVEL_OUTOF10: 0 - NO PAIN
PAINLEVEL_OUTOF10: 0 - NO PAIN

## 2025-08-10 ASSESSMENT — PAIN DESCRIPTION - LOCATION: LOCATION: HEAD

## 2025-08-11 ENCOUNTER — APPOINTMENT (OUTPATIENT)
Dept: RADIOLOGY | Facility: HOSPITAL | Age: 85
End: 2025-08-11
Payer: MEDICARE

## 2025-08-11 ENCOUNTER — APPOINTMENT (OUTPATIENT)
Dept: CARDIOLOGY | Facility: HOSPITAL | Age: 85
End: 2025-08-11
Payer: MEDICARE

## 2025-08-11 VITALS
WEIGHT: 188.1 LBS | HEIGHT: 73 IN | DIASTOLIC BLOOD PRESSURE: 51 MMHG | BODY MASS INDEX: 24.93 KG/M2 | TEMPERATURE: 97.7 F | HEART RATE: 66 BPM | OXYGEN SATURATION: 93 % | RESPIRATION RATE: 17 BRPM | SYSTOLIC BLOOD PRESSURE: 111 MMHG

## 2025-08-11 LAB
ANION GAP SERPL CALC-SCNC: 14 MMOL/L (ref 10–20)
AORTIC VALVE MEAN GRADIENT: 4 MMHG
AORTIC VALVE PEAK VELOCITY: 1.3 M/S
ATRIAL RATE: 76 BPM
AV PEAK GRADIENT: 7 MMHG
AVA (PEAK VEL): 2.73 CM2
AVA (VTI): 3.46 CM2
BASOPHILS # BLD AUTO: 0.06 X10*3/UL (ref 0–0.1)
BASOPHILS NFR BLD AUTO: 0.7 %
BNP SERPL-MCNC: 366 PG/ML (ref 0–99)
BUN SERPL-MCNC: 14 MG/DL (ref 6–23)
CALCIUM SERPL-MCNC: 8.6 MG/DL (ref 8.6–10.3)
CHLORIDE SERPL-SCNC: 102 MMOL/L (ref 98–107)
CHOLEST SERPL-MCNC: 87 MG/DL (ref 0–199)
CHOLESTEROL/HDL RATIO: 2.6
CO2 SERPL-SCNC: 24 MMOL/L (ref 21–32)
CREAT SERPL-MCNC: 0.65 MG/DL (ref 0.5–1.3)
EGFRCR SERPLBLD CKD-EPI 2021: >90 ML/MIN/1.73M*2
EJECTION FRACTION APICAL 4 CHAMBER: 38.2
EJECTION FRACTION: 33 %
EOSINOPHIL # BLD AUTO: 0.09 X10*3/UL (ref 0–0.4)
EOSINOPHIL NFR BLD AUTO: 1.1 %
ERYTHROCYTE [DISTWIDTH] IN BLOOD BY AUTOMATED COUNT: 13.8 % (ref 11.5–14.5)
EST. AVERAGE GLUCOSE BLD GHB EST-MCNC: 220 MG/DL
EST. AVERAGE GLUCOSE BLD GHB EST-MCNC: 223 MG/DL
GLUCOSE BLD MANUAL STRIP-MCNC: 103 MG/DL (ref 74–99)
GLUCOSE BLD MANUAL STRIP-MCNC: 174 MG/DL (ref 74–99)
GLUCOSE BLD MANUAL STRIP-MCNC: 93 MG/DL (ref 74–99)
GLUCOSE SERPL-MCNC: 100 MG/DL (ref 74–99)
HBA1C MFR BLD: 9.3 % (ref ?–5.7)
HBA1C MFR BLD: 9.4 % (ref ?–5.7)
HCT VFR BLD AUTO: 41.1 % (ref 41–52)
HDLC SERPL-MCNC: 33.1 MG/DL
HGB BLD-MCNC: 13.7 G/DL (ref 13.5–17.5)
IMM GRANULOCYTES # BLD AUTO: 0.02 X10*3/UL (ref 0–0.5)
IMM GRANULOCYTES NFR BLD AUTO: 0.2 % (ref 0–0.9)
LDLC SERPL CALC-MCNC: 21 MG/DL
LEFT ATRIUM VOLUME AREA LENGTH INDEX BSA: 14.3 ML/M2
LEFT VENTRICLE INTERNAL DIMENSION DIASTOLE: 4.87 CM (ref 3.5–6)
LEFT VENTRICULAR OUTFLOW TRACT DIAMETER: 2.47 CM
LYMPHOCYTES # BLD AUTO: 2.45 X10*3/UL (ref 0.8–3)
LYMPHOCYTES NFR BLD AUTO: 29.6 %
MCH RBC QN AUTO: 31.5 PG (ref 26–34)
MCHC RBC AUTO-ENTMCNC: 33.3 G/DL (ref 32–36)
MCV RBC AUTO: 95 FL (ref 80–100)
MITRAL VALVE E/A RATIO: 0.73
MONOCYTES # BLD AUTO: 0.6 X10*3/UL (ref 0.05–0.8)
MONOCYTES NFR BLD AUTO: 7.3 %
NEUTROPHILS # BLD AUTO: 5.05 X10*3/UL (ref 1.6–5.5)
NEUTROPHILS NFR BLD AUTO: 61.1 %
NON HDL CHOLESTEROL: 54 MG/DL (ref 0–149)
NRBC BLD-RTO: 0 /100 WBCS (ref 0–0)
P AXIS: 65 DEGREES
P OFFSET: 146 MS
P ONSET: 97 MS
PLATELET # BLD AUTO: 176 X10*3/UL (ref 150–450)
POTASSIUM SERPL-SCNC: 3.7 MMOL/L (ref 3.5–5.3)
PR INTERVAL: 208 MS
Q ONSET: 201 MS
QRS COUNT: 12 BEATS
QRS DURATION: 178 MS
QT INTERVAL: 444 MS
QTC CALCULATION(BAZETT): 499 MS
QTC FREDERICIA: 480 MS
R AXIS: -57 DEGREES
RBC # BLD AUTO: 4.35 X10*6/UL (ref 4.5–5.9)
RIGHT VENTRICLE FREE WALL PEAK S': 6 CM/S
RIGHT VENTRICLE PEAK SYSTOLIC PRESSURE: 17 MMHG
SODIUM SERPL-SCNC: 136 MMOL/L (ref 136–145)
T AXIS: 114 DEGREES
T OFFSET: 423 MS
TRICUSPID ANNULAR PLANE SYSTOLIC EXCURSION: 1.8 CM
TRIGL SERPL-MCNC: 164 MG/DL (ref 0–149)
VENTRICULAR RATE: 76 BPM
VLDL: 33 MG/DL (ref 0–40)
WBC # BLD AUTO: 8.3 X10*3/UL (ref 4.4–11.3)

## 2025-08-11 PROCEDURE — 80048 BASIC METABOLIC PNL TOTAL CA: CPT | Performed by: INTERNAL MEDICINE

## 2025-08-11 PROCEDURE — C8929 TTE W OR WO FOL WCON,DOPPLER: HCPCS

## 2025-08-11 PROCEDURE — 2500000004 HC RX 250 GENERAL PHARMACY W/ HCPCS (ALT 636 FOR OP/ED): Performed by: PHYSICIAN ASSISTANT

## 2025-08-11 PROCEDURE — 94762 N-INVAS EAR/PLS OXIMTRY CONT: CPT

## 2025-08-11 PROCEDURE — 99239 HOSP IP/OBS DSCHRG MGMT >30: CPT | Performed by: STUDENT IN AN ORGANIZED HEALTH CARE EDUCATION/TRAINING PROGRAM

## 2025-08-11 PROCEDURE — 82947 ASSAY GLUCOSE BLOOD QUANT: CPT

## 2025-08-11 PROCEDURE — 2500000001 HC RX 250 WO HCPCS SELF ADMINISTERED DRUGS (ALT 637 FOR MEDICARE OP): Performed by: INTERNAL MEDICINE

## 2025-08-11 PROCEDURE — 99223 1ST HOSP IP/OBS HIGH 75: CPT | Performed by: PSYCHIATRY & NEUROLOGY

## 2025-08-11 PROCEDURE — 97161 PT EVAL LOW COMPLEX 20 MIN: CPT | Mod: GP

## 2025-08-11 PROCEDURE — 70544 MR ANGIOGRAPHY HEAD W/O DYE: CPT

## 2025-08-11 PROCEDURE — 83036 HEMOGLOBIN GLYCOSYLATED A1C: CPT | Mod: GEALAB | Performed by: INTERNAL MEDICINE

## 2025-08-11 PROCEDURE — G0378 HOSPITAL OBSERVATION PER HR: HCPCS

## 2025-08-11 PROCEDURE — 36415 COLL VENOUS BLD VENIPUNCTURE: CPT | Performed by: INTERNAL MEDICINE

## 2025-08-11 PROCEDURE — 2500000001 HC RX 250 WO HCPCS SELF ADMINISTERED DRUGS (ALT 637 FOR MEDICARE OP): Performed by: PHYSICIAN ASSISTANT

## 2025-08-11 PROCEDURE — 2500000004 HC RX 250 GENERAL PHARMACY W/ HCPCS (ALT 636 FOR OP/ED): Performed by: STUDENT IN AN ORGANIZED HEALTH CARE EDUCATION/TRAINING PROGRAM

## 2025-08-11 PROCEDURE — 2500000002 HC RX 250 W HCPCS SELF ADMINISTERED DRUGS (ALT 637 FOR MEDICARE OP, ALT 636 FOR OP/ED): Performed by: INTERNAL MEDICINE

## 2025-08-11 PROCEDURE — 2500000004 HC RX 250 GENERAL PHARMACY W/ HCPCS (ALT 636 FOR OP/ED): Performed by: INTERNAL MEDICINE

## 2025-08-11 PROCEDURE — 85025 COMPLETE CBC W/AUTO DIFF WBC: CPT | Performed by: INTERNAL MEDICINE

## 2025-08-11 PROCEDURE — 93306 TTE W/DOPPLER COMPLETE: CPT | Performed by: STUDENT IN AN ORGANIZED HEALTH CARE EDUCATION/TRAINING PROGRAM

## 2025-08-11 PROCEDURE — 80061 LIPID PANEL: CPT | Performed by: INTERNAL MEDICINE

## 2025-08-11 PROCEDURE — 99239 HOSP IP/OBS DSCHRG MGMT >30: CPT | Performed by: PHYSICIAN ASSISTANT

## 2025-08-11 PROCEDURE — 96360 HYDRATION IV INFUSION INIT: CPT | Mod: 59

## 2025-08-11 PROCEDURE — 83880 ASSAY OF NATRIURETIC PEPTIDE: CPT | Performed by: INTERNAL MEDICINE

## 2025-08-11 PROCEDURE — 96372 THER/PROPH/DIAG INJ SC/IM: CPT | Performed by: INTERNAL MEDICINE

## 2025-08-11 PROCEDURE — 70551 MRI BRAIN STEM W/O DYE: CPT

## 2025-08-11 RX ORDER — CLOPIDOGREL BISULFATE 75 MG/1
75 TABLET ORAL DAILY
Qty: 21 TABLET | Refills: 0 | Status: SHIPPED | OUTPATIENT
Start: 2025-08-11 | End: 2025-09-01

## 2025-08-11 RX ORDER — DEXTROSE 50 % IN WATER (D50W) INTRAVENOUS SYRINGE
25
Status: DISCONTINUED | OUTPATIENT
Start: 2025-08-11 | End: 2025-08-11 | Stop reason: HOSPADM

## 2025-08-11 RX ORDER — INSULIN LISPRO 100 [IU]/ML
0-10 INJECTION, SOLUTION INTRAVENOUS; SUBCUTANEOUS
Status: DISCONTINUED | OUTPATIENT
Start: 2025-08-11 | End: 2025-08-11 | Stop reason: HOSPADM

## 2025-08-11 RX ORDER — LORAZEPAM 0.5 MG/1
0.5 TABLET ORAL
Status: COMPLETED | OUTPATIENT
Start: 2025-08-11 | End: 2025-08-11

## 2025-08-11 RX ORDER — DEXTROSE 50 % IN WATER (D50W) INTRAVENOUS SYRINGE
12.5
Status: DISCONTINUED | OUTPATIENT
Start: 2025-08-11 | End: 2025-08-11 | Stop reason: HOSPADM

## 2025-08-11 RX ADMIN — ENOXAPARIN SODIUM 40 MG: 40 INJECTION SUBCUTANEOUS at 00:05

## 2025-08-11 RX ADMIN — ASPIRIN 81 MG: 81 TABLET, CHEWABLE ORAL at 10:11

## 2025-08-11 RX ADMIN — PERFLUTREN 2.5 ML OF DILUTION: 6.52 INJECTION, SUSPENSION INTRAVENOUS at 15:00

## 2025-08-11 RX ADMIN — DORZOLAMIDE HYDROCHLORIDE AND TIMOLOL MALEATE 1 DROP: 20; 5 SOLUTION/ DROPS OPHTHALMIC at 08:37

## 2025-08-11 RX ADMIN — SODIUM CHLORIDE 500 ML: 0.9 INJECTION, SOLUTION INTRAVENOUS at 12:39

## 2025-08-11 RX ADMIN — LORAZEPAM 0.5 MG: 0.5 TABLET ORAL at 15:16

## 2025-08-11 RX ADMIN — INSULIN LISPRO 2 UNITS: 100 INJECTION, SOLUTION INTRAVENOUS; SUBCUTANEOUS at 12:05

## 2025-08-11 RX ADMIN — CARVEDILOL 3.12 MG: 3.12 TABLET, FILM COATED ORAL at 08:34

## 2025-08-11 RX ADMIN — LEVOTHYROXINE SODIUM 88 MCG: 0.09 TABLET ORAL at 05:48

## 2025-08-11 RX ADMIN — ROSUVASTATIN CALCIUM 40 MG: 20 TABLET, FILM COATED ORAL at 00:05

## 2025-08-11 RX ADMIN — SACUBITRIL AND VALSARTAN 1 TABLET: 24; 26 TABLET, FILM COATED ORAL at 00:05

## 2025-08-11 RX ADMIN — DOCUSATE SODIUM 100 MG: 100 CAPSULE, LIQUID FILLED ORAL at 08:34

## 2025-08-11 RX ADMIN — LATANOPROST 1 DROP: 50 SOLUTION OPHTHALMIC at 00:22

## 2025-08-11 RX ADMIN — EMPAGLIFLOZIN 25 MG: 10 TABLET, FILM COATED ORAL at 08:34

## 2025-08-11 ASSESSMENT — ENCOUNTER SYMPTOMS
EYES NEGATIVE: 1
SPEECH DIFFICULTY: 1
MUSCULOSKELETAL NEGATIVE: 1
ENDOCRINE NEGATIVE: 1
PSYCHIATRIC NEGATIVE: 1
HEMATOLOGIC/LYMPHATIC NEGATIVE: 1
CONSTITUTIONAL NEGATIVE: 1
ALLERGIC/IMMUNOLOGIC NEGATIVE: 1
CARDIOVASCULAR NEGATIVE: 1
RESPIRATORY NEGATIVE: 1
GASTROINTESTINAL NEGATIVE: 1

## 2025-08-11 ASSESSMENT — COGNITIVE AND FUNCTIONAL STATUS - GENERAL
CLIMB 3 TO 5 STEPS WITH RAILING: A LITTLE
STANDING UP FROM CHAIR USING ARMS: A LITTLE
MOBILITY SCORE: 20
CLIMB 3 TO 5 STEPS WITH RAILING: A LITTLE
STANDING UP FROM CHAIR USING ARMS: A LITTLE
MOVING TO AND FROM BED TO CHAIR: A LITTLE
DRESSING REGULAR LOWER BODY CLOTHING: A LITTLE
TOILETING: A LITTLE
WALKING IN HOSPITAL ROOM: A LITTLE
HELP NEEDED FOR BATHING: A LITTLE
WALKING IN HOSPITAL ROOM: A LITTLE
DRESSING REGULAR UPPER BODY CLOTHING: A LITTLE
MOBILITY SCORE: 20
DAILY ACTIVITIY SCORE: 20
MOVING TO AND FROM BED TO CHAIR: A LITTLE

## 2025-08-11 ASSESSMENT — PAIN - FUNCTIONAL ASSESSMENT
PAIN_FUNCTIONAL_ASSESSMENT: 0-10
PAIN_FUNCTIONAL_ASSESSMENT: 0-10

## 2025-08-11 ASSESSMENT — ACTIVITIES OF DAILY LIVING (ADL)
LACK_OF_TRANSPORTATION: NO
ADL_ASSISTANCE: INDEPENDENT

## 2025-08-11 ASSESSMENT — PAIN SCALES - GENERAL: PAINLEVEL_OUTOF10: 0 - NO PAIN

## 2025-08-12 DIAGNOSIS — I50.22 CHRONIC SYSTOLIC HEART FAILURE: ICD-10-CM

## 2025-08-18 ENCOUNTER — APPOINTMENT (OUTPATIENT)
Dept: PHARMACY | Facility: HOSPITAL | Age: 85
End: 2025-08-18
Payer: MEDICARE

## 2025-08-18 DIAGNOSIS — E11.65 TYPE 2 DIABETES MELLITUS WITH HYPERGLYCEMIA, WITHOUT LONG-TERM CURRENT USE OF INSULIN: ICD-10-CM

## 2025-08-18 PROCEDURE — RXMED WILLOW AMBULATORY MEDICATION CHARGE

## 2025-08-18 RX ORDER — SEMAGLUTIDE 1.34 MG/ML
1 INJECTION, SOLUTION SUBCUTANEOUS WEEKLY
Qty: 3 ML | Refills: 1 | Status: SHIPPED | OUTPATIENT
Start: 2025-08-18

## 2025-08-20 ENCOUNTER — PHARMACY VISIT (OUTPATIENT)
Dept: PHARMACY | Facility: CLINIC | Age: 85
End: 2025-08-20
Payer: COMMERCIAL

## 2025-08-21 LAB
ATRIAL RATE: 76 BPM
P AXIS: 65 DEGREES
P OFFSET: 146 MS
P ONSET: 97 MS
PR INTERVAL: 208 MS
Q ONSET: 201 MS
QRS COUNT: 12 BEATS
QRS DURATION: 178 MS
QT INTERVAL: 444 MS
QTC CALCULATION(BAZETT): 499 MS
QTC FREDERICIA: 480 MS
R AXIS: -57 DEGREES
T AXIS: 114 DEGREES
T OFFSET: 423 MS
VENTRICULAR RATE: 76 BPM

## 2025-08-29 ENCOUNTER — APPOINTMENT (OUTPATIENT)
Dept: CARDIOLOGY | Facility: CLINIC | Age: 85
End: 2025-08-29
Payer: MEDICARE

## 2025-09-04 ENCOUNTER — TELEPHONE (OUTPATIENT)
Dept: PHARMACY | Facility: HOSPITAL | Age: 85
End: 2025-09-04
Payer: MEDICARE

## 2025-09-15 ENCOUNTER — APPOINTMENT (OUTPATIENT)
Dept: PHARMACY | Facility: HOSPITAL | Age: 85
End: 2025-09-15
Payer: MEDICARE

## 2025-09-29 ENCOUNTER — APPOINTMENT (OUTPATIENT)
Dept: PHARMACY | Facility: HOSPITAL | Age: 85
End: 2025-09-29
Payer: MEDICARE